# Patient Record
Sex: MALE | Race: WHITE | NOT HISPANIC OR LATINO | Employment: OTHER | URBAN - METROPOLITAN AREA
[De-identification: names, ages, dates, MRNs, and addresses within clinical notes are randomized per-mention and may not be internally consistent; named-entity substitution may affect disease eponyms.]

---

## 2017-01-01 ENCOUNTER — HOSPITAL ENCOUNTER (INPATIENT)
Facility: HOSPITAL | Age: 52
LOS: 3 days | Discharge: NURSING, REHAB OR LONG-TERM CARE | End: 2017-12-09
Attending: EMERGENCY MEDICINE | Admitting: INTERNAL MEDICINE

## 2017-01-01 ENCOUNTER — APPOINTMENT (OUTPATIENT)
Dept: GENERAL RADIOLOGY | Facility: HOSPITAL | Age: 52
End: 2017-01-01

## 2017-01-01 ENCOUNTER — APPOINTMENT (OUTPATIENT)
Dept: GENERAL RADIOLOGY | Facility: HOSPITAL | Age: 52
End: 2017-01-01
Attending: INTERNAL MEDICINE

## 2017-01-01 ENCOUNTER — HOSPITAL ENCOUNTER (INPATIENT)
Facility: HOSPITAL | Age: 52
LOS: 3 days | Discharge: NURSING, REHAB OR LONG-TERM CARE | End: 2017-06-26
Attending: EMERGENCY MEDICINE | Admitting: INTERNAL MEDICINE

## 2017-01-01 VITALS
BODY MASS INDEX: 30.66 KG/M2 | OXYGEN SATURATION: 97 % | DIASTOLIC BLOOD PRESSURE: 92 MMHG | WEIGHT: 219 LBS | HEART RATE: 77 BPM | SYSTOLIC BLOOD PRESSURE: 132 MMHG | HEIGHT: 71 IN | RESPIRATION RATE: 16 BRPM | TEMPERATURE: 98 F

## 2017-01-01 VITALS
OXYGEN SATURATION: 97 % | WEIGHT: 211.7 LBS | RESPIRATION RATE: 16 BRPM | DIASTOLIC BLOOD PRESSURE: 84 MMHG | HEART RATE: 83 BPM | BODY MASS INDEX: 31.36 KG/M2 | TEMPERATURE: 98.6 F | SYSTOLIC BLOOD PRESSURE: 120 MMHG | HEIGHT: 69 IN

## 2017-01-01 DIAGNOSIS — J10.1 INFLUENZA A: ICD-10-CM

## 2017-01-01 DIAGNOSIS — R50.9 FEVER, UNSPECIFIED FEVER CAUSE: ICD-10-CM

## 2017-01-01 DIAGNOSIS — R53.1 GENERALIZED WEAKNESS: ICD-10-CM

## 2017-01-01 DIAGNOSIS — N39.0 URINARY TRACT INFECTION WITHOUT HEMATURIA, SITE UNSPECIFIED: Primary | ICD-10-CM

## 2017-01-01 DIAGNOSIS — A41.9 SEPSIS, DUE TO UNSPECIFIED ORGANISM: Primary | ICD-10-CM

## 2017-01-01 LAB
ALBUMIN SERPL-MCNC: 4.2 G/DL (ref 3.5–5.2)
ALBUMIN SERPL-MCNC: 4.3 G/DL (ref 3.5–5.2)
ALBUMIN/GLOB SERPL: 1.2 G/DL
ALBUMIN/GLOB SERPL: 1.3 G/DL
ALP SERPL-CCNC: 130 U/L (ref 39–117)
ALP SERPL-CCNC: 137 U/L (ref 39–117)
ALT SERPL W P-5'-P-CCNC: 29 U/L (ref 1–41)
ALT SERPL W P-5'-P-CCNC: 31 U/L (ref 1–41)
ANION GAP SERPL CALCULATED.3IONS-SCNC: 12.4 MMOL/L
ANION GAP SERPL CALCULATED.3IONS-SCNC: 12.5 MMOL/L
ANION GAP SERPL CALCULATED.3IONS-SCNC: 13.5 MMOL/L
ANION GAP SERPL CALCULATED.3IONS-SCNC: 14.1 MMOL/L
ANION GAP SERPL CALCULATED.3IONS-SCNC: 14.1 MMOL/L
ANION GAP SERPL CALCULATED.3IONS-SCNC: 14.3 MMOL/L
ANION GAP SERPL CALCULATED.3IONS-SCNC: 15.1 MMOL/L
ANION GAP SERPL CALCULATED.3IONS-SCNC: 15.5 MMOL/L
ANION GAP SERPL CALCULATED.3IONS-SCNC: 18.7 MMOL/L
AST SERPL-CCNC: 19 U/L (ref 1–40)
AST SERPL-CCNC: 21 U/L (ref 1–40)
BACTERIA SPEC AEROBE CULT: ABNORMAL
BACTERIA SPEC AEROBE CULT: NORMAL
BACTERIA UR QL AUTO: ABNORMAL /HPF
BASOPHILS # BLD AUTO: 0.03 10*3/MM3 (ref 0–0.2)
BASOPHILS # BLD AUTO: 0.04 10*3/MM3 (ref 0–0.2)
BASOPHILS # BLD AUTO: 0.04 10*3/MM3 (ref 0–0.2)
BASOPHILS # BLD AUTO: 0.05 10*3/MM3 (ref 0–0.2)
BASOPHILS # BLD AUTO: 0.06 10*3/MM3 (ref 0–0.2)
BASOPHILS NFR BLD AUTO: 0.2 % (ref 0–1.5)
BASOPHILS NFR BLD AUTO: 0.5 % (ref 0–1.5)
BASOPHILS NFR BLD AUTO: 0.6 % (ref 0–1.5)
BASOPHILS NFR BLD AUTO: 0.6 % (ref 0–1.5)
BASOPHILS NFR BLD AUTO: 0.7 % (ref 0–1.5)
BILIRUB SERPL-MCNC: 0.2 MG/DL (ref 0.1–1.2)
BILIRUB SERPL-MCNC: 0.3 MG/DL (ref 0.1–1.2)
BILIRUB UR QL STRIP: NEGATIVE
BILIRUB UR QL STRIP: NEGATIVE
BUN BLD-MCNC: 10 MG/DL (ref 6–20)
BUN BLD-MCNC: 12 MG/DL (ref 6–20)
BUN BLD-MCNC: 12 MG/DL (ref 6–20)
BUN BLD-MCNC: 6 MG/DL (ref 6–20)
BUN BLD-MCNC: 8 MG/DL (ref 6–20)
BUN BLD-MCNC: 8 MG/DL (ref 6–20)
BUN BLD-MCNC: 9 MG/DL (ref 6–20)
BUN/CREAT SERPL: 10.8 (ref 7–25)
BUN/CREAT SERPL: 11.9 (ref 7–25)
BUN/CREAT SERPL: 14 (ref 7–25)
BUN/CREAT SERPL: 14.3 (ref 7–25)
BUN/CREAT SERPL: 14.5 (ref 7–25)
BUN/CREAT SERPL: 14.6 (ref 7–25)
BUN/CREAT SERPL: 14.7 (ref 7–25)
BUN/CREAT SERPL: 15.6 (ref 7–25)
BUN/CREAT SERPL: 7 (ref 7–25)
CALCIUM SPEC-SCNC: 8.1 MG/DL (ref 8.6–10.5)
CALCIUM SPEC-SCNC: 8.3 MG/DL (ref 8.6–10.5)
CALCIUM SPEC-SCNC: 8.4 MG/DL (ref 8.6–10.5)
CALCIUM SPEC-SCNC: 8.5 MG/DL (ref 8.6–10.5)
CALCIUM SPEC-SCNC: 8.5 MG/DL (ref 8.6–10.5)
CALCIUM SPEC-SCNC: 8.7 MG/DL (ref 8.6–10.5)
CALCIUM SPEC-SCNC: 8.7 MG/DL (ref 8.6–10.5)
CALCIUM SPEC-SCNC: 9.7 MG/DL (ref 8.6–10.5)
CALCIUM SPEC-SCNC: 9.8 MG/DL (ref 8.6–10.5)
CHLORIDE SERPL-SCNC: 100 MMOL/L (ref 98–107)
CHLORIDE SERPL-SCNC: 100 MMOL/L (ref 98–107)
CHLORIDE SERPL-SCNC: 101 MMOL/L (ref 98–107)
CHLORIDE SERPL-SCNC: 103 MMOL/L (ref 98–107)
CHLORIDE SERPL-SCNC: 105 MMOL/L (ref 98–107)
CHLORIDE SERPL-SCNC: 107 MMOL/L (ref 98–107)
CHLORIDE SERPL-SCNC: 107 MMOL/L (ref 98–107)
CHLORIDE SERPL-SCNC: 94 MMOL/L (ref 98–107)
CHLORIDE SERPL-SCNC: 95 MMOL/L (ref 98–107)
CLARITY UR: ABNORMAL
CLARITY UR: CLEAR
CO2 SERPL-SCNC: 20.9 MMOL/L (ref 22–29)
CO2 SERPL-SCNC: 22.9 MMOL/L (ref 22–29)
CO2 SERPL-SCNC: 22.9 MMOL/L (ref 22–29)
CO2 SERPL-SCNC: 23.5 MMOL/L (ref 22–29)
CO2 SERPL-SCNC: 23.6 MMOL/L (ref 22–29)
CO2 SERPL-SCNC: 23.7 MMOL/L (ref 22–29)
CO2 SERPL-SCNC: 24.5 MMOL/L (ref 22–29)
CO2 SERPL-SCNC: 26.3 MMOL/L (ref 22–29)
CO2 SERPL-SCNC: 27.5 MMOL/L (ref 22–29)
COLOR UR: YELLOW
COLOR UR: YELLOW
CREAT BLD-MCNC: 0.57 MG/DL (ref 0.76–1.27)
CREAT BLD-MCNC: 0.67 MG/DL (ref 0.76–1.27)
CREAT BLD-MCNC: 0.68 MG/DL (ref 0.76–1.27)
CREAT BLD-MCNC: 0.69 MG/DL (ref 0.76–1.27)
CREAT BLD-MCNC: 0.7 MG/DL (ref 0.76–1.27)
CREAT BLD-MCNC: 0.77 MG/DL (ref 0.76–1.27)
CREAT BLD-MCNC: 0.82 MG/DL (ref 0.76–1.27)
CREAT BLD-MCNC: 0.83 MG/DL (ref 0.76–1.27)
CREAT BLD-MCNC: 0.86 MG/DL (ref 0.76–1.27)
D-LACTATE SERPL-SCNC: 1.4 MMOL/L (ref 0.5–2)
D-LACTATE SERPL-SCNC: 1.5 MMOL/L (ref 0.5–2)
D-LACTATE SERPL-SCNC: 7.5 MMOL/L (ref 0.5–2)
DEPRECATED RDW RBC AUTO: 45.9 FL (ref 37–54)
DEPRECATED RDW RBC AUTO: 45.9 FL (ref 37–54)
DEPRECATED RDW RBC AUTO: 46.5 FL (ref 37–54)
DEPRECATED RDW RBC AUTO: 47.1 FL (ref 37–54)
DEPRECATED RDW RBC AUTO: 47.2 FL (ref 37–54)
DEPRECATED RDW RBC AUTO: 48 FL (ref 37–54)
DEPRECATED RDW RBC AUTO: 48.9 FL (ref 37–54)
DEPRECATED RDW RBC AUTO: 49 FL (ref 37–54)
DEPRECATED RDW RBC AUTO: 49.2 FL (ref 37–54)
EOSINOPHIL # BLD AUTO: 0 10*3/MM3 (ref 0–0.7)
EOSINOPHIL # BLD AUTO: 0.03 10*3/MM3 (ref 0–0.7)
EOSINOPHIL # BLD AUTO: 0.04 10*3/MM3 (ref 0–0.7)
EOSINOPHIL # BLD AUTO: 0.11 10*3/MM3 (ref 0–0.7)
EOSINOPHIL # BLD AUTO: 0.14 10*3/MM3 (ref 0–0.7)
EOSINOPHIL NFR BLD AUTO: 0 % (ref 0.3–6.2)
EOSINOPHIL NFR BLD AUTO: 0.4 % (ref 0.3–6.2)
EOSINOPHIL NFR BLD AUTO: 0.5 % (ref 0.3–6.2)
EOSINOPHIL NFR BLD AUTO: 1.3 % (ref 0.3–6.2)
EOSINOPHIL NFR BLD AUTO: 2.3 % (ref 0.3–6.2)
ERYTHROCYTE [DISTWIDTH] IN BLOOD BY AUTOMATED COUNT: 14.6 % (ref 11.5–14.5)
ERYTHROCYTE [DISTWIDTH] IN BLOOD BY AUTOMATED COUNT: 14.7 % (ref 11.5–14.5)
ERYTHROCYTE [DISTWIDTH] IN BLOOD BY AUTOMATED COUNT: 14.7 % (ref 11.5–14.5)
ERYTHROCYTE [DISTWIDTH] IN BLOOD BY AUTOMATED COUNT: 14.8 % (ref 11.5–14.5)
ERYTHROCYTE [DISTWIDTH] IN BLOOD BY AUTOMATED COUNT: 14.9 % (ref 11.5–14.5)
ERYTHROCYTE [DISTWIDTH] IN BLOOD BY AUTOMATED COUNT: 15 % (ref 11.5–14.5)
ERYTHROCYTE [DISTWIDTH] IN BLOOD BY AUTOMATED COUNT: 15.4 % (ref 11.5–14.5)
ERYTHROCYTE [DISTWIDTH] IN BLOOD BY AUTOMATED COUNT: 15.4 % (ref 11.5–14.5)
ERYTHROCYTE [DISTWIDTH] IN BLOOD BY AUTOMATED COUNT: 15.5 % (ref 11.5–14.5)
FLUAV AG NPH QL: POSITIVE
FLUBV AG NPH QL IA: NEGATIVE
GFR SERPL CREATININE-BSD FRML MDRD: 107 ML/MIN/1.73
GFR SERPL CREATININE-BSD FRML MDRD: 119 ML/MIN/1.73
GFR SERPL CREATININE-BSD FRML MDRD: 121 ML/MIN/1.73
GFR SERPL CREATININE-BSD FRML MDRD: 123 ML/MIN/1.73
GFR SERPL CREATININE-BSD FRML MDRD: 125 ML/MIN/1.73
GFR SERPL CREATININE-BSD FRML MDRD: 94 ML/MIN/1.73
GFR SERPL CREATININE-BSD FRML MDRD: 98 ML/MIN/1.73
GFR SERPL CREATININE-BSD FRML MDRD: 99 ML/MIN/1.73
GFR SERPL CREATININE-BSD FRML MDRD: >150 ML/MIN/1.73
GLOBULIN UR ELPH-MCNC: 3.3 GM/DL
GLOBULIN UR ELPH-MCNC: 3.5 GM/DL
GLUCOSE BLD-MCNC: 104 MG/DL (ref 65–99)
GLUCOSE BLD-MCNC: 107 MG/DL (ref 65–99)
GLUCOSE BLD-MCNC: 108 MG/DL (ref 65–99)
GLUCOSE BLD-MCNC: 144 MG/DL (ref 65–99)
GLUCOSE BLD-MCNC: 80 MG/DL (ref 65–99)
GLUCOSE BLD-MCNC: 86 MG/DL (ref 65–99)
GLUCOSE BLD-MCNC: 88 MG/DL (ref 65–99)
GLUCOSE BLD-MCNC: 90 MG/DL (ref 65–99)
GLUCOSE BLD-MCNC: 93 MG/DL (ref 65–99)
GLUCOSE BLDC GLUCOMTR-MCNC: 107 MG/DL (ref 70–130)
GLUCOSE UR STRIP-MCNC: NEGATIVE MG/DL
GLUCOSE UR STRIP-MCNC: NEGATIVE MG/DL
HCT VFR BLD AUTO: 39.9 % (ref 40.4–52.2)
HCT VFR BLD AUTO: 40.1 % (ref 40.4–52.2)
HCT VFR BLD AUTO: 40.4 % (ref 40.4–52.2)
HCT VFR BLD AUTO: 41 % (ref 40.4–52.2)
HCT VFR BLD AUTO: 41 % (ref 40.4–52.2)
HCT VFR BLD AUTO: 41.6 % (ref 40.4–52.2)
HCT VFR BLD AUTO: 42.9 % (ref 40.4–52.2)
HCT VFR BLD AUTO: 44.7 % (ref 40.4–52.2)
HCT VFR BLD AUTO: 45.5 % (ref 40.4–52.2)
HGB BLD-MCNC: 12.5 G/DL (ref 13.7–17.6)
HGB BLD-MCNC: 12.9 G/DL (ref 13.7–17.6)
HGB BLD-MCNC: 13 G/DL (ref 13.7–17.6)
HGB BLD-MCNC: 13.1 G/DL (ref 13.7–17.6)
HGB BLD-MCNC: 13.1 G/DL (ref 13.7–17.6)
HGB BLD-MCNC: 13.3 G/DL (ref 13.7–17.6)
HGB BLD-MCNC: 13.5 G/DL (ref 13.7–17.6)
HGB BLD-MCNC: 14.7 G/DL (ref 13.7–17.6)
HGB BLD-MCNC: 14.7 G/DL (ref 13.7–17.6)
HGB UR QL STRIP.AUTO: ABNORMAL
HGB UR QL STRIP.AUTO: NEGATIVE
HOLD SPECIMEN: NORMAL
HYALINE CASTS UR QL AUTO: ABNORMAL /LPF
IMM GRANULOCYTES # BLD: 0 10*3/MM3 (ref 0–0.03)
IMM GRANULOCYTES # BLD: 0.02 10*3/MM3 (ref 0–0.03)
IMM GRANULOCYTES # BLD: 0.03 10*3/MM3 (ref 0–0.03)
IMM GRANULOCYTES # BLD: 0.03 10*3/MM3 (ref 0–0.03)
IMM GRANULOCYTES # BLD: 0.05 10*3/MM3 (ref 0–0.03)
IMM GRANULOCYTES NFR BLD: 0 % (ref 0–0.5)
IMM GRANULOCYTES NFR BLD: 0.3 % (ref 0–0.5)
IMM GRANULOCYTES NFR BLD: 0.4 % (ref 0–0.5)
KETONES UR QL STRIP: NEGATIVE
KETONES UR QL STRIP: NEGATIVE
LEUKOCYTE ESTERASE UR QL STRIP.AUTO: ABNORMAL
LEUKOCYTE ESTERASE UR QL STRIP.AUTO: NEGATIVE
LYMPHOCYTES # BLD AUTO: 0.98 10*3/MM3 (ref 0.9–4.8)
LYMPHOCYTES # BLD AUTO: 1.07 10*3/MM3 (ref 0.9–4.8)
LYMPHOCYTES # BLD AUTO: 1.5 10*3/MM3 (ref 0.9–4.8)
LYMPHOCYTES # BLD AUTO: 1.98 10*3/MM3 (ref 0.9–4.8)
LYMPHOCYTES # BLD AUTO: 2.07 10*3/MM3 (ref 0.9–4.8)
LYMPHOCYTES NFR BLD AUTO: 17.3 % (ref 19.6–45.3)
LYMPHOCYTES NFR BLD AUTO: 18.4 % (ref 19.6–45.3)
LYMPHOCYTES NFR BLD AUTO: 25.3 % (ref 19.6–45.3)
LYMPHOCYTES NFR BLD AUTO: 25.6 % (ref 19.6–45.3)
LYMPHOCYTES NFR BLD AUTO: 8.2 % (ref 19.6–45.3)
MCH RBC QN AUTO: 27.2 PG (ref 27–32.7)
MCH RBC QN AUTO: 27.2 PG (ref 27–32.7)
MCH RBC QN AUTO: 27.4 PG (ref 27–32.7)
MCH RBC QN AUTO: 27.4 PG (ref 27–32.7)
MCH RBC QN AUTO: 27.7 PG (ref 27–32.7)
MCH RBC QN AUTO: 27.8 PG (ref 27–32.7)
MCH RBC QN AUTO: 28.4 PG (ref 27–32.7)
MCHC RBC AUTO-ENTMCNC: 31.3 G/DL (ref 32.6–36.4)
MCHC RBC AUTO-ENTMCNC: 31.5 G/DL (ref 32.6–36.4)
MCHC RBC AUTO-ENTMCNC: 31.7 G/DL (ref 32.6–36.4)
MCHC RBC AUTO-ENTMCNC: 31.9 G/DL (ref 32.6–36.4)
MCHC RBC AUTO-ENTMCNC: 32 G/DL (ref 32.6–36.4)
MCHC RBC AUTO-ENTMCNC: 32 G/DL (ref 32.6–36.4)
MCHC RBC AUTO-ENTMCNC: 32.3 G/DL (ref 32.6–36.4)
MCHC RBC AUTO-ENTMCNC: 32.7 G/DL (ref 32.6–36.4)
MCHC RBC AUTO-ENTMCNC: 32.9 G/DL (ref 32.6–36.4)
MCV RBC AUTO: 84.8 FL (ref 79.8–96.2)
MCV RBC AUTO: 85.2 FL (ref 79.8–96.2)
MCV RBC AUTO: 85.7 FL (ref 79.8–96.2)
MCV RBC AUTO: 86.5 FL (ref 79.8–96.2)
MCV RBC AUTO: 86.5 FL (ref 79.8–96.2)
MCV RBC AUTO: 86.7 FL (ref 79.8–96.2)
MCV RBC AUTO: 86.7 FL (ref 79.8–96.2)
MCV RBC AUTO: 86.8 FL (ref 79.8–96.2)
MCV RBC AUTO: 87 FL (ref 79.8–96.2)
MONOCYTES # BLD AUTO: 0.6 10*3/MM3 (ref 0.2–1.2)
MONOCYTES # BLD AUTO: 0.7 10*3/MM3 (ref 0.2–1.2)
MONOCYTES # BLD AUTO: 0.85 10*3/MM3 (ref 0.2–1.2)
MONOCYTES # BLD AUTO: 1 10*3/MM3 (ref 0.2–1.2)
MONOCYTES # BLD AUTO: 1.02 10*3/MM3 (ref 0.2–1.2)
MONOCYTES NFR BLD AUTO: 10.4 % (ref 5–12)
MONOCYTES NFR BLD AUTO: 13.2 % (ref 5–12)
MONOCYTES NFR BLD AUTO: 16.2 % (ref 5–12)
MONOCYTES NFR BLD AUTO: 5 % (ref 5–12)
MONOCYTES NFR BLD AUTO: 8.6 % (ref 5–12)
NEUTROPHILS # BLD AUTO: 10.35 10*3/MM3 (ref 1.9–8.1)
NEUTROPHILS # BLD AUTO: 3.91 10*3/MM3 (ref 1.9–8.1)
NEUTROPHILS # BLD AUTO: 4.61 10*3/MM3 (ref 1.9–8.1)
NEUTROPHILS # BLD AUTO: 5.07 10*3/MM3 (ref 1.9–8.1)
NEUTROPHILS # BLD AUTO: 5.89 10*3/MM3 (ref 1.9–8.1)
NEUTROPHILS NFR BLD AUTO: 59.7 % (ref 42.7–76)
NEUTROPHILS NFR BLD AUTO: 61.9 % (ref 42.7–76)
NEUTROPHILS NFR BLD AUTO: 63.3 % (ref 42.7–76)
NEUTROPHILS NFR BLD AUTO: 72.1 % (ref 42.7–76)
NEUTROPHILS NFR BLD AUTO: 86.2 % (ref 42.7–76)
NITRITE UR QL STRIP: NEGATIVE
NITRITE UR QL STRIP: POSITIVE
PH UR STRIP.AUTO: 7.5 [PH] (ref 5–8)
PH UR STRIP.AUTO: 8 [PH] (ref 5–8)
PLATELET # BLD AUTO: 204 10*3/MM3 (ref 140–500)
PLATELET # BLD AUTO: 221 10*3/MM3 (ref 140–500)
PLATELET # BLD AUTO: 223 10*3/MM3 (ref 140–500)
PLATELET # BLD AUTO: 226 10*3/MM3 (ref 140–500)
PLATELET # BLD AUTO: 230 10*3/MM3 (ref 140–500)
PLATELET # BLD AUTO: 245 10*3/MM3 (ref 140–500)
PLATELET # BLD AUTO: 245 10*3/MM3 (ref 140–500)
PLATELET # BLD AUTO: 293 10*3/MM3 (ref 140–500)
PLATELET # BLD AUTO: 322 10*3/MM3 (ref 140–500)
PMV BLD AUTO: 10 FL (ref 6–12)
PMV BLD AUTO: 10.1 FL (ref 6–12)
PMV BLD AUTO: 10.1 FL (ref 6–12)
PMV BLD AUTO: 10.2 FL (ref 6–12)
PMV BLD AUTO: 10.3 FL (ref 6–12)
PMV BLD AUTO: 10.3 FL (ref 6–12)
PMV BLD AUTO: 9.8 FL (ref 6–12)
PMV BLD AUTO: 9.9 FL (ref 6–12)
PMV BLD AUTO: 9.9 FL (ref 6–12)
POTASSIUM BLD-SCNC: 3 MMOL/L (ref 3.5–5.2)
POTASSIUM BLD-SCNC: 3.1 MMOL/L (ref 3.5–5.2)
POTASSIUM BLD-SCNC: 3.1 MMOL/L (ref 3.5–5.2)
POTASSIUM BLD-SCNC: 3.4 MMOL/L (ref 3.5–5.2)
POTASSIUM BLD-SCNC: 3.5 MMOL/L (ref 3.5–5.2)
POTASSIUM BLD-SCNC: 3.7 MMOL/L (ref 3.5–5.2)
POTASSIUM BLD-SCNC: 4.1 MMOL/L (ref 3.5–5.2)
PROCALCITONIN SERPL-MCNC: 0.05 NG/ML (ref 0.1–0.25)
PROT SERPL-MCNC: 7.6 G/DL (ref 6–8.5)
PROT SERPL-MCNC: 7.7 G/DL (ref 6–8.5)
PROT UR QL STRIP: ABNORMAL
PROT UR QL STRIP: NEGATIVE
RBC # BLD AUTO: 4.6 10*6/MM3 (ref 4.6–6)
RBC # BLD AUTO: 4.73 10*6/MM3 (ref 4.6–6)
RBC # BLD AUTO: 4.73 10*6/MM3 (ref 4.6–6)
RBC # BLD AUTO: 4.74 10*6/MM3 (ref 4.6–6)
RBC # BLD AUTO: 4.74 10*6/MM3 (ref 4.6–6)
RBC # BLD AUTO: 4.79 10*6/MM3 (ref 4.6–6)
RBC # BLD AUTO: 4.93 10*6/MM3 (ref 4.6–6)
RBC # BLD AUTO: 5.17 10*6/MM3 (ref 4.6–6)
RBC # BLD AUTO: 5.31 10*6/MM3 (ref 4.6–6)
RBC # UR: ABNORMAL /HPF
REF LAB TEST METHOD: ABNORMAL
SODIUM BLD-SCNC: 132 MMOL/L (ref 136–145)
SODIUM BLD-SCNC: 137 MMOL/L (ref 136–145)
SODIUM BLD-SCNC: 139 MMOL/L (ref 136–145)
SODIUM BLD-SCNC: 140 MMOL/L (ref 136–145)
SODIUM BLD-SCNC: 140 MMOL/L (ref 136–145)
SODIUM BLD-SCNC: 141 MMOL/L (ref 136–145)
SODIUM BLD-SCNC: 142 MMOL/L (ref 136–145)
SODIUM BLD-SCNC: 143 MMOL/L (ref 136–145)
SODIUM BLD-SCNC: 144 MMOL/L (ref 136–145)
SP GR UR STRIP: 1.01 (ref 1–1.03)
SP GR UR STRIP: 1.02 (ref 1–1.03)
SQUAMOUS #/AREA URNS HPF: ABNORMAL /HPF
UROBILINOGEN UR QL STRIP: ABNORMAL
UROBILINOGEN UR QL STRIP: NORMAL
WBC CLUMPS # UR AUTO: ABNORMAL /HPF
WBC NRBC COR # BLD: 11.23 10*3/MM3 (ref 4.5–10.7)
WBC NRBC COR # BLD: 12.01 10*3/MM3 (ref 4.5–10.7)
WBC NRBC COR # BLD: 6.18 10*3/MM3 (ref 4.5–10.7)
WBC NRBC COR # BLD: 6.3 10*3/MM3 (ref 4.5–10.7)
WBC NRBC COR # BLD: 6.34 10*3/MM3 (ref 4.5–10.7)
WBC NRBC COR # BLD: 7.73 10*3/MM3 (ref 4.5–10.7)
WBC NRBC COR # BLD: 8.16 10*3/MM3 (ref 4.5–10.7)
WBC NRBC COR # BLD: 8.19 10*3/MM3 (ref 4.5–10.7)
WBC NRBC COR # BLD: 8.7 10*3/MM3 (ref 4.5–10.7)
WBC UR QL AUTO: ABNORMAL /HPF
WHOLE BLOOD HOLD SPECIMEN: NORMAL
WHOLE BLOOD HOLD SPECIMEN: NORMAL

## 2017-01-01 PROCEDURE — 25010000002 ENOXAPARIN PER 10 MG: Performed by: INTERNAL MEDICINE

## 2017-01-01 PROCEDURE — 80048 BASIC METABOLIC PNL TOTAL CA: CPT | Performed by: INTERNAL MEDICINE

## 2017-01-01 PROCEDURE — 87186 SC STD MICRODIL/AGAR DIL: CPT | Performed by: EMERGENCY MEDICINE

## 2017-01-01 PROCEDURE — 81003 URINALYSIS AUTO W/O SCOPE: CPT | Performed by: EMERGENCY MEDICINE

## 2017-01-01 PROCEDURE — 85027 COMPLETE CBC AUTOMATED: CPT | Performed by: INTERNAL MEDICINE

## 2017-01-01 PROCEDURE — 80048 BASIC METABOLIC PNL TOTAL CA: CPT | Performed by: NURSE PRACTITIONER

## 2017-01-01 PROCEDURE — 80053 COMPREHEN METABOLIC PANEL: CPT | Performed by: EMERGENCY MEDICINE

## 2017-01-01 PROCEDURE — 85025 COMPLETE CBC W/AUTO DIFF WBC: CPT | Performed by: NURSE PRACTITIONER

## 2017-01-01 PROCEDURE — 92610 EVALUATE SWALLOWING FUNCTION: CPT

## 2017-01-01 PROCEDURE — 99285 EMERGENCY DEPT VISIT HI MDM: CPT

## 2017-01-01 PROCEDURE — 83605 ASSAY OF LACTIC ACID: CPT | Performed by: EMERGENCY MEDICINE

## 2017-01-01 PROCEDURE — 85025 COMPLETE CBC W/AUTO DIFF WBC: CPT | Performed by: EMERGENCY MEDICINE

## 2017-01-01 PROCEDURE — 97162 PT EVAL MOD COMPLEX 30 MIN: CPT

## 2017-01-01 PROCEDURE — G0378 HOSPITAL OBSERVATION PER HR: HCPCS

## 2017-01-01 PROCEDURE — 82962 GLUCOSE BLOOD TEST: CPT

## 2017-01-01 PROCEDURE — 87040 BLOOD CULTURE FOR BACTERIA: CPT | Performed by: EMERGENCY MEDICINE

## 2017-01-01 PROCEDURE — 25010000003 LEVETIRACETAM IN NACL 0.75% 1000 MG/100ML SOLUTION: Performed by: EMERGENCY MEDICINE

## 2017-01-01 PROCEDURE — 25010000002 PIPERACILLIN SOD-TAZOBACTAM PER 1 G: Performed by: EMERGENCY MEDICINE

## 2017-01-01 PROCEDURE — 97110 THERAPEUTIC EXERCISES: CPT

## 2017-01-01 PROCEDURE — 25010000002 PIPERACILLIN SOD-TAZOBACTAM PER 1 G: Performed by: INTERNAL MEDICINE

## 2017-01-01 PROCEDURE — 25010000003 CEFTRIAXONE PER 250 MG: Performed by: INTERNAL MEDICINE

## 2017-01-01 PROCEDURE — 71010 HC CHEST PA OR AP: CPT

## 2017-01-01 PROCEDURE — 25010000003 CEFTRIAXONE PER 250 MG: Performed by: EMERGENCY MEDICINE

## 2017-01-01 PROCEDURE — 92526 ORAL FUNCTION THERAPY: CPT

## 2017-01-01 PROCEDURE — 84145 PROCALCITONIN (PCT): CPT | Performed by: EMERGENCY MEDICINE

## 2017-01-01 PROCEDURE — 87040 BLOOD CULTURE FOR BACTERIA: CPT | Performed by: INTERNAL MEDICINE

## 2017-01-01 PROCEDURE — 87804 INFLUENZA ASSAY W/OPTIC: CPT | Performed by: EMERGENCY MEDICINE

## 2017-01-01 PROCEDURE — 71020 HC CHEST PA AND LATERAL: CPT

## 2017-01-01 PROCEDURE — 87086 URINE CULTURE/COLONY COUNT: CPT | Performed by: EMERGENCY MEDICINE

## 2017-01-01 PROCEDURE — 81001 URINALYSIS AUTO W/SCOPE: CPT | Performed by: EMERGENCY MEDICINE

## 2017-01-01 RX ORDER — LISINOPRIL 10 MG/1
10 TABLET ORAL
Status: DISCONTINUED | OUTPATIENT
Start: 2017-01-01 | End: 2017-01-01 | Stop reason: HOSPADM

## 2017-01-01 RX ORDER — LEVETIRACETAM 10 MG/ML
1000 INJECTION INTRAVASCULAR ONCE
Status: COMPLETED | OUTPATIENT
Start: 2017-01-01 | End: 2017-01-01

## 2017-01-01 RX ORDER — POTASSIUM CHLORIDE 1.5 G/1.77G
40 POWDER, FOR SOLUTION ORAL 2 TIMES DAILY
Status: COMPLETED | OUTPATIENT
Start: 2017-01-01 | End: 2017-01-01

## 2017-01-01 RX ORDER — OSELTAMIVIR PHOSPHATE 75 MG/1
75 CAPSULE ORAL EVERY 12 HOURS SCHEDULED
Qty: 4 CAPSULE | Refills: 0 | Status: SHIPPED | OUTPATIENT
Start: 2017-01-01 | End: 2017-01-01

## 2017-01-01 RX ORDER — WATER / MINERAL OIL / WHITE PETROLATUM 16 OZ
CREAM TOPICAL 2 TIMES DAILY
COMMUNITY
End: 2017-01-01 | Stop reason: HOSPADM

## 2017-01-01 RX ORDER — ATORVASTATIN CALCIUM 80 MG/1
80 TABLET, FILM COATED ORAL NIGHTLY
Status: DISCONTINUED | OUTPATIENT
Start: 2017-01-01 | End: 2017-01-01 | Stop reason: HOSPADM

## 2017-01-01 RX ORDER — ATORVASTATIN CALCIUM 80 MG/1
80 TABLET, FILM COATED ORAL DAILY
Status: DISCONTINUED | OUTPATIENT
Start: 2017-01-01 | End: 2017-01-01 | Stop reason: HOSPADM

## 2017-01-01 RX ORDER — OSELTAMIVIR PHOSPHATE 75 MG/1
75 CAPSULE ORAL EVERY 12 HOURS SCHEDULED
Status: DISCONTINUED | OUTPATIENT
Start: 2017-01-01 | End: 2017-01-01 | Stop reason: HOSPADM

## 2017-01-01 RX ORDER — CEFTRIAXONE SODIUM 1 G/50ML
1 INJECTION, SOLUTION INTRAVENOUS EVERY 24 HOURS
Status: DISCONTINUED | OUTPATIENT
Start: 2017-01-01 | End: 2017-01-01 | Stop reason: HOSPADM

## 2017-01-01 RX ORDER — SODIUM CHLORIDE 0.9 % (FLUSH) 0.9 %
10 SYRINGE (ML) INJECTION AS NEEDED
Status: DISCONTINUED | OUTPATIENT
Start: 2017-01-01 | End: 2017-01-01 | Stop reason: HOSPADM

## 2017-01-01 RX ORDER — FAMOTIDINE 20 MG/1
20 TABLET, FILM COATED ORAL 2 TIMES DAILY
Status: DISCONTINUED | OUTPATIENT
Start: 2017-01-01 | End: 2017-01-01 | Stop reason: HOSPADM

## 2017-01-01 RX ORDER — LEVETIRACETAM 500 MG/1
500 TABLET ORAL 2 TIMES DAILY
Status: DISCONTINUED | OUTPATIENT
Start: 2017-01-01 | End: 2017-01-01 | Stop reason: HOSPADM

## 2017-01-01 RX ORDER — LORAZEPAM 2 MG/ML
1 INJECTION INTRAMUSCULAR EVERY 4 HOURS PRN
Status: DISCONTINUED | OUTPATIENT
Start: 2017-01-01 | End: 2017-01-01 | Stop reason: HOSPADM

## 2017-01-01 RX ORDER — ACETAMINOPHEN 500 MG
1000 TABLET ORAL ONCE
Status: DISCONTINUED | OUTPATIENT
Start: 2017-01-01 | End: 2017-01-01 | Stop reason: HOSPADM

## 2017-01-01 RX ORDER — LEVETIRACETAM 500 MG/1
1000 TABLET ORAL 2 TIMES DAILY
Status: DISCONTINUED | OUTPATIENT
Start: 2017-01-01 | End: 2017-01-01 | Stop reason: HOSPADM

## 2017-01-01 RX ORDER — LEVETIRACETAM 750 MG/1
500 TABLET ORAL 2 TIMES DAILY
COMMUNITY
Start: 2016-12-07 | End: 2017-01-01 | Stop reason: HOSPADM

## 2017-01-01 RX ORDER — VERAPAMIL HYDROCHLORIDE 240 MG/1
240 TABLET, FILM COATED, EXTENDED RELEASE ORAL NIGHTLY
Status: DISCONTINUED | OUTPATIENT
Start: 2017-01-01 | End: 2017-01-01 | Stop reason: HOSPADM

## 2017-01-01 RX ORDER — CASTOR OIL AND BALSAM, PERU 788; 87 MG/G; MG/G
OINTMENT TOPICAL EVERY MORNING
COMMUNITY

## 2017-01-01 RX ORDER — HYDROCHLOROTHIAZIDE 25 MG/1
25 TABLET ORAL DAILY
Status: DISCONTINUED | OUTPATIENT
Start: 2017-01-01 | End: 2017-01-01 | Stop reason: HOSPADM

## 2017-01-01 RX ORDER — POTASSIUM CHLORIDE 750 MG/1
40 CAPSULE, EXTENDED RELEASE ORAL ONCE
Status: COMPLETED | OUTPATIENT
Start: 2017-01-01 | End: 2017-01-01

## 2017-01-01 RX ORDER — ONDANSETRON 4 MG/1
4 TABLET, FILM COATED ORAL EVERY 6 HOURS PRN
COMMUNITY

## 2017-01-01 RX ORDER — OXCARBAZEPINE 300 MG/1
1200 TABLET, FILM COATED ORAL 2 TIMES DAILY
Status: DISCONTINUED | OUTPATIENT
Start: 2017-01-01 | End: 2017-01-01 | Stop reason: HOSPADM

## 2017-01-01 RX ORDER — OSELTAMIVIR PHOSPHATE 75 MG/1
75 CAPSULE ORAL ONCE
Status: COMPLETED | OUTPATIENT
Start: 2017-01-01 | End: 2017-01-01

## 2017-01-01 RX ORDER — LEVETIRACETAM 500 MG/1
1500 TABLET ORAL EVERY 12 HOURS SCHEDULED
Status: DISCONTINUED | OUTPATIENT
Start: 2017-01-01 | End: 2017-01-01 | Stop reason: HOSPADM

## 2017-01-01 RX ORDER — LEVETIRACETAM 500 MG/1
500 TABLET ORAL 2 TIMES DAILY
Qty: 60 TABLET | Refills: 3 | Status: SHIPPED | OUTPATIENT
Start: 2017-01-01

## 2017-01-01 RX ORDER — OXCARBAZEPINE 300 MG/1
1200 TABLET, FILM COATED ORAL EVERY 12 HOURS SCHEDULED
Status: DISCONTINUED | OUTPATIENT
Start: 2017-01-01 | End: 2017-01-01 | Stop reason: HOSPADM

## 2017-01-01 RX ORDER — GABAPENTIN 600 MG/1
600 TABLET ORAL 4 TIMES DAILY
COMMUNITY
Start: 2016-01-28 | End: 2017-01-01 | Stop reason: HOSPADM

## 2017-01-01 RX ORDER — ACETAMINOPHEN 325 MG/1
650 TABLET ORAL EVERY 4 HOURS PRN
Status: DISCONTINUED | OUTPATIENT
Start: 2017-01-01 | End: 2017-01-01 | Stop reason: HOSPADM

## 2017-01-01 RX ORDER — POTASSIUM CHLORIDE 750 MG/1
20 TABLET, FILM COATED, EXTENDED RELEASE ORAL DAILY
Qty: 10 TABLET | Refills: 0 | Status: SHIPPED | OUTPATIENT
Start: 2017-01-01 | End: 2017-01-01

## 2017-01-01 RX ORDER — VERAPAMIL HYDROCHLORIDE 240 MG/1
240 TABLET, FILM COATED, EXTENDED RELEASE ORAL NIGHTLY
COMMUNITY
Start: 2016-02-01

## 2017-01-01 RX ORDER — HYDROCHLOROTHIAZIDE 25 MG/1
25 TABLET ORAL DAILY
COMMUNITY
End: 2017-01-01 | Stop reason: HOSPADM

## 2017-01-01 RX ORDER — CEFUROXIME AXETIL 250 MG/1
250 TABLET ORAL 2 TIMES DAILY
Qty: 12 TABLET | Refills: 0 | Status: SHIPPED | OUTPATIENT
Start: 2017-01-01 | End: 2017-01-01

## 2017-01-01 RX ORDER — ATORVASTATIN CALCIUM 80 MG/1
80 TABLET, FILM COATED ORAL DAILY
Status: DISCONTINUED | OUTPATIENT
Start: 2017-01-01 | End: 2017-01-01

## 2017-01-01 RX ORDER — LEVETIRACETAM 1000 MG/1
1000 TABLET ORAL 2 TIMES DAILY
Qty: 60 TABLET | Refills: 0 | Status: SHIPPED | OUTPATIENT
Start: 2017-01-01

## 2017-01-01 RX ORDER — GABAPENTIN 300 MG/1
600 CAPSULE ORAL EVERY 6 HOURS
Status: DISCONTINUED | OUTPATIENT
Start: 2017-01-01 | End: 2017-01-01 | Stop reason: HOSPADM

## 2017-01-01 RX ORDER — ACETAMINOPHEN 160 MG/5ML
650 SOLUTION ORAL EVERY 4 HOURS PRN
Status: DISCONTINUED | OUTPATIENT
Start: 2017-01-01 | End: 2017-01-01

## 2017-01-01 RX ORDER — RANITIDINE 150 MG/1
TABLET ORAL
COMMUNITY
Start: 2017-01-01

## 2017-01-01 RX ORDER — CEFTRIAXONE SODIUM 1 G/50ML
1 INJECTION, SOLUTION INTRAVENOUS ONCE
Status: COMPLETED | OUTPATIENT
Start: 2017-01-01 | End: 2017-01-01

## 2017-01-01 RX ORDER — DIAPER,BRIEF,INFANT-TODD,DISP
EACH MISCELLANEOUS DAILY
COMMUNITY

## 2017-01-01 RX ORDER — CLONIDINE HYDROCHLORIDE 0.1 MG/1
0.1 TABLET ORAL 3 TIMES DAILY PRN
COMMUNITY
End: 2017-01-01 | Stop reason: HOSPADM

## 2017-01-01 RX ORDER — OXCARBAZEPINE 600 MG/1
1200 TABLET, FILM COATED ORAL 2 TIMES DAILY
COMMUNITY
Start: 2017-01-01 | End: 2018-03-01

## 2017-01-01 RX ORDER — RANITIDINE 150 MG/1
150 TABLET ORAL NIGHTLY
COMMUNITY
End: 2017-01-01 | Stop reason: HOSPADM

## 2017-01-01 RX ORDER — LORATADINE 10 MG/1
10 TABLET ORAL DAILY
COMMUNITY
End: 2017-01-01 | Stop reason: HOSPADM

## 2017-01-01 RX ORDER — POTASSIUM CHLORIDE 750 MG/1
40 CAPSULE, EXTENDED RELEASE ORAL 2 TIMES DAILY
Status: COMPLETED | OUTPATIENT
Start: 2017-01-01 | End: 2017-01-01

## 2017-01-01 RX ORDER — CLONIDINE HYDROCHLORIDE 0.1 MG/1
0.1 TABLET ORAL 3 TIMES DAILY PRN
Status: DISCONTINUED | OUTPATIENT
Start: 2017-01-01 | End: 2017-01-01 | Stop reason: HOSPADM

## 2017-01-01 RX ORDER — LEVETIRACETAM 500 MG/1
1000 TABLET ORAL 2 TIMES DAILY
COMMUNITY

## 2017-01-01 RX ORDER — ONDANSETRON 4 MG/1
4 TABLET, FILM COATED ORAL EVERY 6 HOURS PRN
Status: DISCONTINUED | OUTPATIENT
Start: 2017-01-01 | End: 2017-01-01 | Stop reason: HOSPADM

## 2017-01-01 RX ORDER — DEXTROSE AND SODIUM CHLORIDE 5; .45 G/100ML; G/100ML
100 INJECTION, SOLUTION INTRAVENOUS CONTINUOUS
Status: DISCONTINUED | OUTPATIENT
Start: 2017-01-01 | End: 2017-01-01

## 2017-01-01 RX ORDER — ATORVASTATIN CALCIUM 40 MG/1
80 TABLET, FILM COATED ORAL
COMMUNITY

## 2017-01-01 RX ORDER — LISINOPRIL 10 MG/1
10 TABLET ORAL
Qty: 30 TABLET | Refills: 2 | Status: SHIPPED | OUTPATIENT
Start: 2017-01-01

## 2017-01-01 RX ADMIN — FAMOTIDINE 20 MG: 20 TABLET, FILM COATED ORAL at 17:37

## 2017-01-01 RX ADMIN — CEFTRIAXONE SODIUM 1 G: 1 INJECTION, SOLUTION INTRAVENOUS at 17:05

## 2017-01-01 RX ADMIN — OXCARBAZEPINE 1200 MG: 300 TABLET ORAL at 08:40

## 2017-01-01 RX ADMIN — ATORVASTATIN CALCIUM 80 MG: 80 TABLET, FILM COATED ORAL at 09:46

## 2017-01-01 RX ADMIN — OXCARBAZEPINE 1200 MG: 300 TABLET ORAL at 08:14

## 2017-01-01 RX ADMIN — POTASSIUM CHLORIDE 40 MEQ: 1.5 POWDER, FOR SOLUTION ORAL at 08:14

## 2017-01-01 RX ADMIN — CEFTRIAXONE SODIUM 1 G: 1 INJECTION, SOLUTION INTRAVENOUS at 16:38

## 2017-01-01 RX ADMIN — POTASSIUM CHLORIDE 40 MEQ: 750 CAPSULE, EXTENDED RELEASE ORAL at 10:29

## 2017-01-01 RX ADMIN — OXCARBAZEPINE 1200 MG: 300 TABLET, FILM COATED ORAL at 20:56

## 2017-01-01 RX ADMIN — LEVETIRACETAM 500 MG: 500 TABLET, FILM COATED ORAL at 09:46

## 2017-01-01 RX ADMIN — POTASSIUM CHLORIDE 40 MEQ: 1.5 POWDER, FOR SOLUTION ORAL at 11:36

## 2017-01-01 RX ADMIN — LEVETIRACETAM 1000 MG: 500 TABLET, FILM COATED ORAL at 08:21

## 2017-01-01 RX ADMIN — TAZOBACTAM SODIUM AND PIPERACILLIN SODIUM 3.38 G: 375; 3 INJECTION, SOLUTION INTRAVENOUS at 17:28

## 2017-01-01 RX ADMIN — LEVETIRACETAM 1500 MG: 500 TABLET, FILM COATED ORAL at 20:51

## 2017-01-01 RX ADMIN — LEVETIRACETAM 1500 MG: 500 TABLET, FILM COATED ORAL at 09:58

## 2017-01-01 RX ADMIN — ENOXAPARIN SODIUM 40 MG: 40 INJECTION SUBCUTANEOUS at 15:05

## 2017-01-01 RX ADMIN — OSELTAMIVIR PHOSPHATE 75 MG: 75 CAPSULE ORAL at 12:58

## 2017-01-01 RX ADMIN — FAMOTIDINE 20 MG: 20 TABLET, FILM COATED ORAL at 18:02

## 2017-01-01 RX ADMIN — ENOXAPARIN SODIUM 40 MG: 40 INJECTION SUBCUTANEOUS at 10:23

## 2017-01-01 RX ADMIN — GABAPENTIN 600 MG: 300 CAPSULE ORAL at 22:15

## 2017-01-01 RX ADMIN — LEVETIRACETAM 1000 MG: 500 TABLET, FILM COATED ORAL at 19:50

## 2017-01-01 RX ADMIN — LEVETIRACETAM 500 MG: 500 TABLET, FILM COATED ORAL at 08:22

## 2017-01-01 RX ADMIN — FAMOTIDINE 20 MG: 20 TABLET, FILM COATED ORAL at 19:54

## 2017-01-01 RX ADMIN — OXCARBAZEPINE 1200 MG: 300 TABLET, FILM COATED ORAL at 08:21

## 2017-01-01 RX ADMIN — VERAPAMIL HYDROCHLORIDE 240 MG: 240 TABLET, FILM COATED, EXTENDED RELEASE ORAL at 20:58

## 2017-01-01 RX ADMIN — LEVETIRACETAM 1500 MG: 500 TABLET, FILM COATED ORAL at 20:56

## 2017-01-01 RX ADMIN — SODIUM CHLORIDE 2721 ML: 9 INJECTION, SOLUTION INTRAVENOUS at 01:21

## 2017-01-01 RX ADMIN — FAMOTIDINE 20 MG: 20 TABLET, FILM COATED ORAL at 08:27

## 2017-01-01 RX ADMIN — GABAPENTIN 600 MG: 300 CAPSULE ORAL at 09:57

## 2017-01-01 RX ADMIN — ATORVASTATIN CALCIUM 80 MG: 80 TABLET, FILM COATED ORAL at 22:52

## 2017-01-01 RX ADMIN — LEVETIRACETAM 1500 MG: 500 TABLET, FILM COATED ORAL at 21:10

## 2017-01-01 RX ADMIN — POTASSIUM CHLORIDE 40 MEQ: 750 CAPSULE, EXTENDED RELEASE ORAL at 13:26

## 2017-01-01 RX ADMIN — GABAPENTIN 600 MG: 300 CAPSULE ORAL at 14:44

## 2017-01-01 RX ADMIN — VERAPAMIL HYDROCHLORIDE 240 MG: 240 TABLET, FILM COATED, EXTENDED RELEASE ORAL at 21:10

## 2017-01-01 RX ADMIN — FAMOTIDINE 20 MG: 20 TABLET, FILM COATED ORAL at 08:32

## 2017-01-01 RX ADMIN — OSELTAMIVIR PHOSPHATE 75 MG: 75 CAPSULE ORAL at 08:39

## 2017-01-01 RX ADMIN — HYDROCHLOROTHIAZIDE 25 MG: 25 TABLET ORAL at 10:22

## 2017-01-01 RX ADMIN — LEVETIRACETAM 500 MG: 500 TABLET, FILM COATED ORAL at 08:41

## 2017-01-01 RX ADMIN — OXCARBAZEPINE 1200 MG: 300 TABLET, FILM COATED ORAL at 20:52

## 2017-01-01 RX ADMIN — ATORVASTATIN CALCIUM 80 MG: 80 TABLET, FILM COATED ORAL at 08:27

## 2017-01-01 RX ADMIN — GABAPENTIN 600 MG: 300 CAPSULE ORAL at 03:56

## 2017-01-01 RX ADMIN — OSELTAMIVIR PHOSPHATE 75 MG: 75 CAPSULE ORAL at 03:16

## 2017-01-01 RX ADMIN — FAMOTIDINE 20 MG: 20 TABLET, FILM COATED ORAL at 18:50

## 2017-01-01 RX ADMIN — OXCARBAZEPINE 1200 MG: 300 TABLET, FILM COATED ORAL at 09:58

## 2017-01-01 RX ADMIN — TAZOBACTAM SODIUM AND PIPERACILLIN SODIUM 3.38 G: 375; 3 INJECTION, SOLUTION INTRAVENOUS at 01:49

## 2017-01-01 RX ADMIN — FAMOTIDINE 20 MG: 20 TABLET, FILM COATED ORAL at 08:40

## 2017-01-01 RX ADMIN — VERAPAMIL HYDROCHLORIDE 240 MG: 240 TABLET, FILM COATED, EXTENDED RELEASE ORAL at 22:52

## 2017-01-01 RX ADMIN — GABAPENTIN 600 MG: 300 CAPSULE ORAL at 20:56

## 2017-01-01 RX ADMIN — LEVETIRACETAM 500 MG: 500 TABLET, FILM COATED ORAL at 17:29

## 2017-01-01 RX ADMIN — ENOXAPARIN SODIUM 40 MG: 40 INJECTION SUBCUTANEOUS at 08:40

## 2017-01-01 RX ADMIN — SODIUM CHLORIDE 1000 ML: 9 INJECTION, SOLUTION INTRAVENOUS at 16:44

## 2017-01-01 RX ADMIN — VERAPAMIL HYDROCHLORIDE 240 MG: 240 TABLET, FILM COATED, EXTENDED RELEASE ORAL at 22:15

## 2017-01-01 RX ADMIN — OXCARBAZEPINE 1200 MG: 300 TABLET, FILM COATED ORAL at 08:28

## 2017-01-01 RX ADMIN — ACETAMINOPHEN 650 MG: 325 TABLET ORAL at 00:19

## 2017-01-01 RX ADMIN — GABAPENTIN 600 MG: 300 CAPSULE ORAL at 08:21

## 2017-01-01 RX ADMIN — GABAPENTIN 600 MG: 300 CAPSULE ORAL at 08:27

## 2017-01-01 RX ADMIN — TAZOBACTAM SODIUM AND PIPERACILLIN SODIUM 3.38 G: 375; 3 INJECTION, SOLUTION INTRAVENOUS at 17:37

## 2017-01-01 RX ADMIN — GABAPENTIN 600 MG: 300 CAPSULE ORAL at 21:10

## 2017-01-01 RX ADMIN — ENOXAPARIN SODIUM 40 MG: 40 INJECTION SUBCUTANEOUS at 08:22

## 2017-01-01 RX ADMIN — ATORVASTATIN CALCIUM 80 MG: 80 TABLET, FILM COATED ORAL at 10:22

## 2017-01-01 RX ADMIN — ACETAMINOPHEN 650 MG: 325 TABLET ORAL at 05:49

## 2017-01-01 RX ADMIN — FAMOTIDINE 20 MG: 20 TABLET, FILM COATED ORAL at 17:29

## 2017-01-01 RX ADMIN — LEVETIRACETAM 1500 MG: 500 TABLET, FILM COATED ORAL at 21:07

## 2017-01-01 RX ADMIN — OSELTAMIVIR PHOSPHATE 75 MG: 75 CAPSULE ORAL at 08:31

## 2017-01-01 RX ADMIN — VERAPAMIL HYDROCHLORIDE 240 MG: 240 TABLET, FILM COATED, EXTENDED RELEASE ORAL at 21:13

## 2017-01-01 RX ADMIN — OSELTAMIVIR PHOSPHATE 75 MG: 75 CAPSULE ORAL at 20:23

## 2017-01-01 RX ADMIN — FAMOTIDINE 20 MG: 20 TABLET, FILM COATED ORAL at 17:36

## 2017-01-01 RX ADMIN — LEVETIRACETAM 1000 MG: 500 TABLET, FILM COATED ORAL at 08:31

## 2017-01-01 RX ADMIN — TAZOBACTAM SODIUM AND PIPERACILLIN SODIUM 3.38 G: 375; 3 INJECTION, SOLUTION INTRAVENOUS at 01:23

## 2017-01-01 RX ADMIN — OXCARBAZEPINE 1200 MG: 300 TABLET ORAL at 12:58

## 2017-01-01 RX ADMIN — ENOXAPARIN SODIUM 40 MG: 40 INJECTION SUBCUTANEOUS at 09:45

## 2017-01-01 RX ADMIN — FAMOTIDINE 20 MG: 20 TABLET, FILM COATED ORAL at 08:13

## 2017-01-01 RX ADMIN — OXCARBAZEPINE 1200 MG: 300 TABLET ORAL at 08:32

## 2017-01-01 RX ADMIN — TAZOBACTAM SODIUM AND PIPERACILLIN SODIUM 3.38 G: 375; 3 INJECTION, SOLUTION INTRAVENOUS at 08:40

## 2017-01-01 RX ADMIN — ATORVASTATIN CALCIUM 80 MG: 80 TABLET, FILM COATED ORAL at 08:21

## 2017-01-01 RX ADMIN — TAZOBACTAM SODIUM AND PIPERACILLIN SODIUM 3.38 G: 375; 3 INJECTION, SOLUTION INTRAVENOUS at 09:46

## 2017-01-01 RX ADMIN — FAMOTIDINE 20 MG: 20 TABLET, FILM COATED ORAL at 10:22

## 2017-01-01 RX ADMIN — OXCARBAZEPINE 1200 MG: 300 TABLET ORAL at 17:37

## 2017-01-01 RX ADMIN — ATORVASTATIN CALCIUM 80 MG: 80 TABLET, FILM COATED ORAL at 09:58

## 2017-01-01 RX ADMIN — FAMOTIDINE 20 MG: 20 TABLET, FILM COATED ORAL at 08:21

## 2017-01-01 RX ADMIN — ENOXAPARIN SODIUM 40 MG: 40 INJECTION SUBCUTANEOUS at 08:28

## 2017-01-01 RX ADMIN — FAMOTIDINE 20 MG: 20 TABLET, FILM COATED ORAL at 22:15

## 2017-01-01 RX ADMIN — CEFTRIAXONE SODIUM 1 G: 1 INJECTION, SOLUTION INTRAVENOUS at 15:06

## 2017-01-01 RX ADMIN — GABAPENTIN 600 MG: 300 CAPSULE ORAL at 21:12

## 2017-01-01 RX ADMIN — LEVETIRACETAM 1500 MG: 500 TABLET, FILM COATED ORAL at 10:22

## 2017-01-01 RX ADMIN — DEXTROSE AND SODIUM CHLORIDE 100 ML/HR: 5; 450 INJECTION, SOLUTION INTRAVENOUS at 22:14

## 2017-01-01 RX ADMIN — LEVETIRACETAM 1000 MG: 500 TABLET, FILM COATED ORAL at 08:39

## 2017-01-01 RX ADMIN — POTASSIUM CHLORIDE 40 MEQ: 1.5 POWDER, FOR SOLUTION ORAL at 17:29

## 2017-01-01 RX ADMIN — GABAPENTIN 600 MG: 300 CAPSULE ORAL at 10:21

## 2017-01-01 RX ADMIN — HYDROCHLOROTHIAZIDE 25 MG: 25 TABLET ORAL at 08:27

## 2017-01-01 RX ADMIN — ATORVASTATIN CALCIUM 80 MG: 80 TABLET, FILM COATED ORAL at 22:15

## 2017-01-01 RX ADMIN — SODIUM CHLORIDE 2721 ML: 9 INJECTION, SOLUTION INTRAVENOUS at 01:37

## 2017-01-01 RX ADMIN — LEVETIRACETAM 500 MG: 500 TABLET, FILM COATED ORAL at 08:32

## 2017-01-01 RX ADMIN — HYDROCHLOROTHIAZIDE 25 MG: 25 TABLET ORAL at 08:21

## 2017-01-01 RX ADMIN — SODIUM CHLORIDE 2721 ML: 9 INJECTION, SOLUTION INTRAVENOUS at 01:06

## 2017-01-01 RX ADMIN — VERAPAMIL HYDROCHLORIDE 240 MG: 240 TABLET, FILM COATED, EXTENDED RELEASE ORAL at 20:23

## 2017-01-01 RX ADMIN — LEVETIRACETAM 1000 MG: 500 TABLET, FILM COATED ORAL at 17:37

## 2017-01-01 RX ADMIN — ENOXAPARIN SODIUM 40 MG: 40 INJECTION SUBCUTANEOUS at 08:31

## 2017-01-01 RX ADMIN — OXCARBAZEPINE 1200 MG: 300 TABLET ORAL at 17:28

## 2017-01-01 RX ADMIN — OSELTAMIVIR PHOSPHATE 75 MG: 75 CAPSULE ORAL at 19:50

## 2017-01-01 RX ADMIN — OXCARBAZEPINE 1200 MG: 300 TABLET, FILM COATED ORAL at 21:07

## 2017-01-01 RX ADMIN — ENOXAPARIN SODIUM 40 MG: 40 INJECTION SUBCUTANEOUS at 08:16

## 2017-01-01 RX ADMIN — FAMOTIDINE 20 MG: 20 TABLET, FILM COATED ORAL at 09:46

## 2017-01-01 RX ADMIN — GABAPENTIN 600 MG: 300 CAPSULE ORAL at 13:25

## 2017-01-01 RX ADMIN — ATORVASTATIN CALCIUM 80 MG: 80 TABLET, FILM COATED ORAL at 08:40

## 2017-01-01 RX ADMIN — POTASSIUM CHLORIDE 40 MEQ: 750 CAPSULE, EXTENDED RELEASE ORAL at 08:21

## 2017-01-01 RX ADMIN — LEVETIRACETAM 500 MG: 500 TABLET, FILM COATED ORAL at 19:53

## 2017-01-01 RX ADMIN — LISINOPRIL 10 MG: 10 TABLET ORAL at 08:32

## 2017-01-01 RX ADMIN — POTASSIUM CHLORIDE 40 MEQ: 750 CAPSULE, EXTENDED RELEASE ORAL at 21:07

## 2017-01-01 RX ADMIN — OXCARBAZEPINE 1200 MG: 300 TABLET ORAL at 19:55

## 2017-01-01 RX ADMIN — GABAPENTIN 600 MG: 300 CAPSULE ORAL at 04:02

## 2017-01-01 RX ADMIN — LEVETIRACETAM 1500 MG: 500 TABLET, FILM COATED ORAL at 08:27

## 2017-01-01 RX ADMIN — VERAPAMIL HYDROCHLORIDE 240 MG: 240 TABLET, FILM COATED, EXTENDED RELEASE ORAL at 21:07

## 2017-01-01 RX ADMIN — OXCARBAZEPINE 1200 MG: 300 TABLET, FILM COATED ORAL at 21:10

## 2017-01-01 RX ADMIN — CLONIDINE HYDROCHLORIDE 0.1 MG: 0.1 TABLET ORAL at 20:51

## 2017-01-01 RX ADMIN — LEVETIRACETAM 1000 MG: 500 TABLET, FILM COATED ORAL at 17:29

## 2017-01-01 RX ADMIN — FAMOTIDINE 20 MG: 20 TABLET, FILM COATED ORAL at 09:57

## 2017-01-01 RX ADMIN — ACETAMINOPHEN 650 MG: 325 TABLET ORAL at 21:13

## 2017-01-01 RX ADMIN — CEFTRIAXONE SODIUM 1 G: 1 INJECTION, SOLUTION INTRAVENOUS at 17:35

## 2017-01-01 RX ADMIN — LEVETIRACETAM 1000 MG: 500 TABLET, FILM COATED ORAL at 09:46

## 2017-01-01 RX ADMIN — GABAPENTIN 600 MG: 300 CAPSULE ORAL at 02:03

## 2017-01-01 RX ADMIN — TAZOBACTAM SODIUM AND PIPERACILLIN SODIUM 3.38 G: 375; 3 INJECTION, SOLUTION INTRAVENOUS at 08:14

## 2017-01-01 RX ADMIN — LEVETIRACETAM 1000 MG: 10 INJECTION INTRAVENOUS at 01:30

## 2017-01-01 RX ADMIN — OSELTAMIVIR PHOSPHATE 75 MG: 75 CAPSULE ORAL at 08:13

## 2017-01-01 RX ADMIN — HYDROCHLOROTHIAZIDE 25 MG: 25 TABLET ORAL at 22:15

## 2017-01-01 RX ADMIN — LEVETIRACETAM 500 MG: 500 TABLET, FILM COATED ORAL at 17:38

## 2017-01-01 RX ADMIN — DEXTROSE AND SODIUM CHLORIDE 100 ML/HR: 5; 450 INJECTION, SOLUTION INTRAVENOUS at 02:10

## 2017-01-01 RX ADMIN — OSELTAMIVIR PHOSPHATE 75 MG: 75 CAPSULE ORAL at 21:13

## 2017-01-01 RX ADMIN — OXCARBAZEPINE 1200 MG: 300 TABLET, FILM COATED ORAL at 10:21

## 2017-01-01 RX ADMIN — GABAPENTIN 600 MG: 300 CAPSULE ORAL at 15:06

## 2017-01-01 RX ADMIN — POTASSIUM CHLORIDE 40 MEQ: 750 CAPSULE, EXTENDED RELEASE ORAL at 10:06

## 2017-01-01 RX ADMIN — GABAPENTIN 600 MG: 300 CAPSULE ORAL at 02:08

## 2017-01-01 RX ADMIN — HYDROCHLOROTHIAZIDE 25 MG: 25 TABLET ORAL at 09:58

## 2017-01-01 RX ADMIN — LEVETIRACETAM 1500 MG: 500 TABLET, FILM COATED ORAL at 08:21

## 2017-06-22 PROBLEM — N39.0 URINARY TRACT INFECTION WITHOUT HEMATURIA: Status: ACTIVE | Noted: 2017-01-01

## 2017-06-23 PROBLEM — R56.9 SEIZURES (HCC): Chronic | Status: ACTIVE | Noted: 2017-01-01

## 2017-06-23 PROBLEM — G35 MS (MULTIPLE SCLEROSIS) (HCC): Chronic | Status: ACTIVE | Noted: 2017-01-01

## 2017-06-23 PROBLEM — I10 HYPERTENSION: Chronic | Status: ACTIVE | Noted: 2017-01-01

## 2017-06-24 PROBLEM — B96.20 E. COLI UTI: Status: ACTIVE | Noted: 2017-01-01

## 2017-06-24 PROBLEM — N39.0 E. COLI UTI: Status: ACTIVE | Noted: 2017-01-01

## 2017-06-24 PROBLEM — E87.6 HYPOKALEMIA: Status: ACTIVE | Noted: 2017-01-01

## 2017-12-06 PROBLEM — G40.909 SEIZURE DISORDER (HCC): Chronic | Status: ACTIVE | Noted: 2017-01-01

## 2017-12-06 PROBLEM — J10.1 INFLUENZA A: Status: ACTIVE | Noted: 2017-01-01

## 2017-12-06 PROBLEM — A41.9 SEPSIS (HCC): Status: ACTIVE | Noted: 2017-01-01

## 2017-12-06 PROBLEM — G35 MULTIPLE SCLEROSIS (HCC): Chronic | Status: ACTIVE | Noted: 2017-01-01

## 2017-12-06 PROBLEM — F03.90 DEMENTIA (HCC): Chronic | Status: ACTIVE | Noted: 2017-01-01

## 2017-12-06 NOTE — ED TRIAGE NOTES
From [facility], staff stated he had a sz lasting 30-40 sec , staff also stated he refused all meds today, baseline AO x3

## 2017-12-06 NOTE — PLAN OF CARE
Problem: Patient Care Overview (Adult)  Goal: Plan of Care Review  Outcome: Ongoing (interventions implemented as appropriate)    12/06/17 0401 12/06/17 0556   Patient Care Overview   Progress --  no change   Outcome Evaluation   Outcome Summary/Follow up Plan --  Admitted with sepsis from ER. Disoriented x4. Stuttering, hard to communicate. C/o HA, tylenol given. Bed bound at [facility] home. No falls. No SOA. VSS, sinustachy. Will continue to monitor.    Coping/Psychosocial Response Interventions   Plan Of Care Reviewed With patient --          Problem: Seizure Disorder/Epilepsy (Adult)  Goal: Signs and Symptoms of Listed Potential Problems Will be Absent or Manageable (Seizure Disorder/Epilepsy)  Outcome: Ongoing (interventions implemented as appropriate)    12/06/17 0556   Seizure Disorder/Epilepsy   Problems Assessed (Seizure Disorder/Epilepsy) all         Problem: Sepsis (Adult)  Goal: Signs and Symptoms of Listed Potential Problems Will be Absent or Manageable (Sepsis)  Outcome: Ongoing (interventions implemented as appropriate)    12/06/17 0556   Sepsis   Problems Assessed (Sepsis) all   Problems Present (Sepsis) progression of infection

## 2017-12-06 NOTE — PLAN OF CARE
Problem: Multiple Sclerosis (Adult,Obstetrics,Pediatric)  Goal: Signs and Symptoms of Listed Potential Problems Will be Absent or Manageable (Multiple Sclerosis)    12/06/17 0380   Multiple Sclerosis   Problems Assessed (Multiple Sclerosis) all   Problems Present (Multiple Sclerosis) cognitive impairment;infection;speech impairment;bladder/bowel dysfunction;functional decline/self-care deficit

## 2017-12-06 NOTE — DISCHARGE PLACEMENT REQUEST
"Skinny Alfaro (52 y.o. Male)     Date of Birth Social Security Number Address Home Phone MRN    1965  [id number] [place] [id number] [phone] [id number]    Roman Catholic Marital Status          Mandaeism Single       Admission Date Admission Type Admitting Provider Attending Provider Department, Room/Bed    12/6/17 Emergency Nate Meza MD [place], Marlon R, MD [address], 515/1    Discharge Date Discharge Disposition Discharge Destination                      Attending Provider: Nate Meza MD     Allergies:  Baclofen, Benadryl [Diphenhydramine Hcl (Sleep)], Phenobarbital, Phenytoin    Isolation:  Droplet   Infection:  Influenza (12/06/17)   Code Status:  Not on file    Ht:  175.3 cm (69\")   Wt:  98.7 kg (217 lb 9.6 oz)    Admission Cmt:  None   Principal Problem:  Influenza A [J10.1]                 Active Insurance as of 12/6/2017     Primary Coverage     Payor Plan Insurance Group Employer/Plan Group    MEDICARE MEDICARE A & B      Payor Plan Address Payor Plan Phone Number Effective From Effective To    [address] [phone] 12/1/1987     [place] [address]       Subscriber Name Subscriber Birth Date Member ID       SKINNY ALFARO 1965 [id number]           Secondary Coverage     Payor Plan Insurance Group Employer/Plan Group    [place]      Payor Plan Address Payor Plan Phone Number Effective From Effective To    [address] [phone] 6/22/2017     [place] [address]       Subscriber Name Subscriber Birth Date Member ID       SKINNY ALFARO 1965 [id number]                 Emergency Contacts      (Rel.) Home Phone Work Phone Mobile Phone    De OliveiraNathalia calle (Relative) [phone] -- --              "

## 2017-12-06 NOTE — ED NOTES
Ms. Frankel at [facility] notified of pt's positive Influenza A.     Marianne Owen RN  12/06/17 2201

## 2017-12-06 NOTE — THERAPY EVALUATION
Acute Care - Speech Language Pathology   Swallow Initial Evaluation [facility]     Patient Name: Omero Velazquez  : 1965  MRN: [id number]  Today's Date: 2017               Admit Date: 2017  SPEECH-LANGUAGE PATHOLOGY EVALUATION - SWALLOW  Subjective: The patient was seen on this date for a Clinical Swallow evaluation.  Patient was alert and cooperative, required encouragement for continued participation throughout eval. Pt agreeable to limited trials this date. Pt with characteristics of expressive aphasia.   The patient's history is significant for influenza A, MS, HTN, Seizures, dementia.   Objective: Textures given included thin liquid, nectar thick liquid, puree consistency, mechanical soft consistency and regular consistency.  Assessment: Difficulties were noted with thin liquid, characterized by delayed coughing.  SLP Findings:  Patient presents with mild oropharyngeal dysphagia, without esophageal component.   Recommendations: Diet Textures: nectar thick liquid, regular consistency food.  Medications should be taken whole with thickened liquids or puree. May have water and ice between meals after oral care, under staff or family supervision and with the recommended strategies for safe swallowing.   Recommended Strategies: Upright for PO, small bites and sips and no straw. Oral care before breakfast, after all meals and PRN.  Other Recommended Evaluations: Re-evaluation at bedside    Dysphagia therapy is recommended. Rationale: Tolerate least restrictive diet.    Visit Dx:     ICD-10-CM ICD-9-CM   1. Sepsis, due to unspecified organism A41.9 038.9     995.91   2. Influenza A J10.1 487.1     Patient Active Problem List   Diagnosis   • Urinary tract infection without hematuria   • MS (multiple sclerosis)   • Seizures   • Hypertension   • Hypokalemia   • E. coli UTI   • Sepsis   • Multiple sclerosis   • Seizure disorder   • Influenza A   • Dementia     Past Medical History:   Diagnosis Date    • Dementia    • GERD (gastroesophageal reflux disease)    • Hypertension    • MS (multiple sclerosis)    • Seizures      History reviewed. No pertinent surgical history.       SWALLOW EVALUATION (last 72 hours)      Swallow Evaluation       12/06/17 1430                Rehab Evaluation    Document Type evaluation  -OC        Subjective Information agree to therapy  -OC        Patient Effort, Rehab Treatment good  -OC        Symptoms Noted During/After Treatment none  -OC        General Information    Patient Profile Review yes  -OC        Current Diet Limitations NPO  -OC        Prior Level of Function- Communication other (comment)   aphasia  -OC        Prior Level of Function- Swallowing no diet consistency restrictions;other (comment)   per RN, reg/tl at [facility]  -OC        Plans/Goals Discussed With patient  -OC        Barriers to Rehab cognitive status;language barrier  -OC        Clinical Impression    Patient's Goals For Discharge patient could not state  -OC        SLP Swallowing Diagnosis mild dysphagia  -OC        Rehab Potential/Prognosis, Swallowing good, to achieve stated therapy goals  -OC        Criteria for Skilled Therapeutic Interventions Met skilled criteria for dysphagia intervention met  -OC        FCM, Swallowing 5-->Level 5  -OC        Therapy Frequency PRN  -OC        Predicted Duration Therapy Interv (days) until discharge  -OC        Expected Duration Therapy Session (min) 15-30 minutes  -OC        SLP Diet Recommendation regular textures;nectar/syrup-thick liquids  -OC        Recommended Feeding/Eating Techniques alternate between small bites and sips of food/liquid;maintain upright posture during/after eating for 30 mins;small sips/bites;no straws   supervision with PO  -OC        SLP Rec. for Method of Medication Administration meds whole with thickened liquid;meds whole in pudding/applesauce  -OC        Monitor For Signs Of Aspiration cough  -OC        Anticipated Discharge Disposition  placement for care  -OC        Pain Assessment    Pain Assessment Unable to assess  -OC        Cognitive Assessment/Intervention    Current Cognitive/Communication Assessment impaired  -OC        Follows Commands/Answers Questions unable/difficult to assess  -OC        Oral Motor Structure and Function    Oral Motor Anatomy and Physiology --   appears, WFL unable to assess   -OC          User Key  (r) = Recorded By, (t) = Taken By, (c) = Cosigned By    Initials Name Effective Dates    OC Sue Rivera MA,CCC-SLP 04/05/17 -         EDUCATION  The patient has been educated in the following areas:   Dysphagia (Swallowing Impairment) Modified Diet Instruction.    SLP Recommendation and Plan  SLP Swallowing Diagnosis: mild dysphagia  SLP Diet Recommendation: regular textures, nectar/syrup-thick liquids  Recommended Feeding/Eating Techniques: alternate between small bites and sips of food/liquid, maintain upright posture during/after eating for 30 mins, small sips/bites, no straws (supervision with PO)  SLP Rec. for Method of Medication Administration: meds whole with thickened liquid, meds whole in pudding/applesauce  Monitor For Signs Of Aspiration: cough     Criteria for Skilled Therapeutic Interventions Met: skilled criteria for dysphagia intervention met  Anticipated Discharge Disposition: placement for care  Rehab Potential/Prognosis, Swallowing: good, to achieve stated therapy goals  Therapy Frequency: PRN             Plan of Care Review  Plan Of Care Reviewed With: patient  Outcome Summary/Follow up Plan: bedside swallow eval completed.recommend nectar thick liquids with regular textues. meds whole with nectar thick or puree. water protcol ok.           IP SLP Goals       12/06/17 1430          Safely Consume Diet    Safely Consume Diet- SLP, Date Established 12/06/17  -OC      Safely Consume Diet- SLP, Time to Achieve by discharge  -OC      Safely Consume Diet- SLP, Outcome goal ongoing  -OC        User Key  (r) =  Recorded By, (t) = Taken By, (c) = Cosigned By    Initials Name Provider Type    KAVYA Rivera MA,CCC-SLP Speech and Language Pathologist             SLP Outcome Measures (last 72 hours)      SLP Outcome Measures       12/06/17 1430          SLP Outcome Measures    Outcome Measure Used? Adult NOMS  -OC      FCM Scores    FCM Chosen Swallowing  -OC      Swallowing FCM Score 5  -OC        User Key  (r) = Recorded By, (t) = Taken By, (c) = Cosigned By    Initials Name Effective Dates    KAVYA Rivera MA, CCC-SLP 04/05/17 -            Time Calculation:         Time Calculation- SLP       12/06/17 1520          Time Calculation- SLP    SLP Start Time 1315  -OC      SLP Stop Time 1415  -OC      SLP Time Calculation (min) 60 min  -OC      SLP Received On 12/06/17  -OC        User Key  (r) = Recorded By, (t) = Taken By, (c) = Cosigned By    Initials Name Provider Type    KAVYA Rivera MA,ADRIAN-SLP Speech and Language Pathologist          Therapy Charges for Today     Code Description Service Date Service Provider Modifiers Qty    [id number]  ST EVAL ORAL PHARYNG SWALLOW 4 12/6/2017 Sue Rivera MA,ADRIAN-SLP GN 1               Sue Rivera MA, CCC-SLP  12/6/2017

## 2017-12-06 NOTE — PLAN OF CARE
Problem: Patient Care Overview (Adult)  Goal: Plan of Care Review  Outcome: Ongoing (interventions implemented as appropriate)    12/06/17 1430   Outcome Evaluation   Outcome Summary/Follow up Plan bedside swallow eval completed.recommend nectar thick liquids with regular textues. meds whole with nectar thick or puree. water protocol ok.    Coping/Psychosocial Response Interventions   Plan Of Care Reviewed With patient         Problem: Inpatient SLP  Goal: Dysphagia- Patient will safely consume diet as per recommendation with no signs/symptoms of aspiration  Outcome: Ongoing (interventions implemented as appropriate)    12/06/17 1430   Safely Consume Diet   Safely Consume Diet- SLP, Date Established 12/06/17   Safely Consume Diet- SLP, Time to Achieve by discharge   Safely Consume Diet- SLP, Outcome goal ongoing

## 2017-12-06 NOTE — ED NOTES
Pt to room 15 via EMS stretcher from rehab for c/o seizure and fever.  Pt is poor historian, as he has dementia.  Pt is warm to the touch.  Pt is PERRLA, respirations are even and unlabored, chest rise and fall is equal in expansion.  Pt does not appear to be in distress at this time.  Bed in low position, call light within reach, side rails padded and up X2.             Aby Lang RN  12/06/17 0048

## 2017-12-06 NOTE — PROGRESS NOTES
Continued Stay Note  [facility]     Patient Name: Omero Velazquez  MRN: [id number]  Today's Date: 12/6/2017    Admit Date: 12/6/2017          Discharge Plan       12/06/17 1159    Case Management/Social Work Plan    Plan Return to [place]    Additional Comments IMM given 12-06-17 to Nathalia colvin--[id number]. Spoke with renuradha. Face sheet verified.  She states she is not POA but cares for patient and his mother. She sees that their bills are paid. She plans for patient to return  to [place] at discharge  and states he will need ambulance transport.. Packet started and left in [address] [place] [address] office. Spoke with Rachel / Gerald  to advise of admission and inquiry left in her in basket . Await her assessment for level of care        .... DENNISE Crandall              Discharge Codes     None            DENNISE Crandall

## 2017-12-06 NOTE — ED PROVIDER NOTES
EMERGENCY DEPARTMENT ENCOUNTER    CHIEF COMPLAINT  Chief Complaint: Seizures   History given by: NH report  History limited by: dementia, AMS  Room Number: 15/15  PMD: Nate Meza MD      HPI:  Pt is a 52 y.o. male who presents for evaluation of seizures. Per nursing home the pt has a hx of dementia and seizures, and refused all medications today. They report that they witnessed a 30-40 second seizure tonight. They also states that the pt has had a fever today. Temp on arrival to the ED is 100.8.Remainder of HPI is limited by AMS    Duration:  30-40 seconds  Onset: unknown  Timing: single seizure  Intensity/Severity: unknown  Progression: resolved   Associated Symptoms: fever  Aggravating Factors: unknown  Alleviating Factors: unknown  Previous Episodes: hx of seizures   Treatment before arrival: pt refused all medication today     PAST MEDICAL HISTORY  Active Ambulatory Problems     Diagnosis Date Noted   • Urinary tract infection without hematuria 06/22/2017   • MS (multiple sclerosis) 06/23/2017   • Seizures 06/23/2017   • Hypertension 06/23/2017   • Hypokalemia 06/24/2017   • E. coli UTI 06/24/2017     Resolved Ambulatory Problems     Diagnosis Date Noted   • No Resolved Ambulatory Problems     Past Medical History:   Diagnosis Date   • Dementia    • GERD (gastroesophageal reflux disease)    • Hypertension    • MS (multiple sclerosis)    • Seizures        PAST SURGICAL HISTORY  History reviewed. No pertinent surgical history.    FAMILY HISTORY  History reviewed. No pertinent family history.    SOCIAL HISTORY  Social History     Social History   • Marital status: Single     Spouse name: N/A   • Number of children: N/A   • Years of education: N/A     Occupational History   • Not on file.     Social History Main Topics   • Smoking status: Never Smoker   • Smokeless tobacco: Not on file   • Alcohol use No   • Drug use: Defer   • Sexual activity: Defer     Other Topics Concern   • Not on file     Social  History Narrative       ALLERGIES  Baclofen; Benadryl [diphenhydramine hcl (sleep)]; Phenobarbital; and Phenytoin    REVIEW OF SYSTEMS  Review of Systems   Unable to perform ROS: Other       PHYSICAL EXAM  ED Triage Vitals   Temp Heart Rate Resp BP SpO2   12/06/17 0027 12/06/17 0027 12/06/17 0027 12/06/17 0027 12/06/17 0027   100.8 °F (38.2 °C) 131 18 160/95 95 %      Temp src Heart Rate Source Patient Position BP Location FiO2 (%)   12/06/17 0027 12/06/17 0027 -- -- --   Tympanic Monitor          Physical Exam   Constitutional: He is well-developed, well-nourished, and in no distress.   HENT:   Head: Normocephalic and atraumatic.   Eyes: EOM are normal. Pupils are equal, round, and reactive to light.   Neck: Normal range of motion. Neck supple.   Cardiovascular: Regular rhythm and normal heart sounds.  Tachycardia present.    Pulmonary/Chest: Effort normal. No respiratory distress. He has rhonchi (coarse).   Abdominal: Soft. There is no tenderness. There is no rebound and no guarding.   Musculoskeletal: He exhibits no edema.   Contractures present, no edema, normal capillary refill.     Neurological: He is alert.   Incomprehensible speech   Skin: Skin is warm and dry.   Nursing note and vitals reviewed.      LAB RESULTS  Lab Results (last 24 hours)     Procedure Component Value Units Date/Time    CBC & Differential [[id number]] Collected:  12/06/17 0057    Specimen:  Blood Updated:  12/06/17 0135    Narrative:       The following orders were created for panel order CBC & Differential.  Procedure                               Abnormality         Status                     ---------                               -----------         ------                     CBC Auto Differential[[id number]]        Abnormal            Final result                 Please view results for these tests on the individual orders.    Comprehensive Metabolic Panel [[id number]]  (Abnormal) Collected:  12/06/17 0057    Specimen:  Blood Updated:   "12/06/17 0153     Glucose 144 (H) mg/dL      BUN 12 mg/dL      Creatinine 0.77 mg/dL      Sodium 140 mmol/L      Potassium 3.5 mmol/L      Chloride 95 (L) mmol/L      CO2 26.3 mmol/L      Calcium 9.7 mg/dL      Total Protein 7.6 g/dL      Albumin 4.30 g/dL      ALT (SGPT) 29 U/L      AST (SGOT) 19 U/L      Alkaline Phosphatase 137 (H) U/L      Total Bilirubin 0.2 mg/dL      eGFR Non African Amer 107 mL/min/1.73      Globulin 3.3 gm/dL      A/G Ratio 1.3 g/dL      BUN/Creatinine Ratio 15.6     Anion Gap 18.7 mmol/L     Lactic Acid, Plasma [[id number]]  (Abnormal) Collected:  12/06/17 0057    Specimen:  Blood Updated:  12/06/17 0201     Lactate 7.5 (C) mmol/L     Blood Culture - Blood, [[id number]] Collected:  12/06/17 0057    Specimen:  Blood from Arm, Left Updated:  12/06/17 0124    Procalcitonin [[id number]]  (Abnormal) Collected:  12/06/17 0057    Specimen:  Blood Updated:  12/06/17 0200     Procalcitonin 0.05 (L) ng/mL     Narrative:       As a Marker for Sepsis (Non-Neonates):   1. <0.5 ng/mL represents a low risk of severe sepsis and/or septic shock.  1. >2 ng/mL represents a high risk of severe sepsis and/or septic shock.    As a Marker for Lower Respiratory Tract Infections that require antibiotic therapy:  PCT on Admission     Antibiotic Therapy             6-12 Hrs later  > 0.5                Strongly Recommended            >0.25 - <0.5         Recommended  0.1 - 0.25           Discouraged                   Remeasure/reassess PCT  <0.1                 Strongly Discouraged          Remeasure/reassess PCT      As 28 day mortality risk marker: \"Change in Procalcitonin Result\" (> 80 % or <=80 %) if Day 0 (or Day 1) and Day 4 values are available. Refer to http://www.[employer]-pct-calculator.com/   Change in PCT <=80 %   A decrease of PCT levels below or equal to 80 % defines a positive change in PCT test result representing a higher risk for 28-day all-cause mortality of patients diagnosed with severe sepsis or " septic shock.  Change in PCT > 80 %   A decrease of PCT levels of more than 80 % defines a negative change in PCT result representing a lower risk for 28-day all-cause mortality of patients diagnosed with severe sepsis or septic shock.                CBC Auto Differential [[id number]]  (Abnormal) Collected:  12/06/17 0057    Specimen:  Blood Updated:  12/06/17 0135     WBC 12.01 (H) 10*3/mm3      RBC 5.31 10*6/mm3      Hemoglobin 14.7 g/dL      Hematocrit 45.5 %      MCV 85.7 fL      MCH 27.7 pg      MCHC 32.3 (L) g/dL      RDW 14.6 (H) %      RDW-SD 45.9 fl      MPV 10.3 fL      Platelets 322 10*3/mm3      Neutrophil % 86.2 (H) %      Lymphocyte % 8.2 (L) %      Monocyte % 5.0 %      Eosinophil % 0.0 (L) %      Basophil % 0.2 %      Immature Grans % 0.4 %      Neutrophils, Absolute 10.35 (H) 10*3/mm3      Lymphocytes, Absolute 0.98 10*3/mm3      Monocytes, Absolute 0.60 10*3/mm3      Eosinophils, Absolute 0.00 10*3/mm3      Basophils, Absolute 0.03 10*3/mm3      Immature Grans, Absolute 0.05 (H) 10*3/mm3     Lactic Acid, Reflex Timer [[id number]] Collected:  12/06/17 0057    Specimen:  Blood Updated:  12/06/17 0201    Influenza Antigen, Rapid - Swab, Nasopharynx [[id number]]  (Abnormal) Collected:  12/06/17 0133    Specimen:  Swab from Nasopharynx Updated:  12/06/17 0202     Influenza A Ag, EIA Positive (A)     Influenza B Ag, EIA Negative          I ordered the above labs and reviewed the results    RADIOLOGY  XR Chest 1 View    (Results Pending)        I ordered the above noted radiological studies. Interpreted by radiologist. Reviewed by me in PACS.       PROCEDURES  Procedures      PROGRESS AND CONSULTS  ED Course     00:40  BP- 160/95 HR- (!) 131 Temp- (!) 100.8 °F (38.2 °C) (Tympanic) O2 sat- 95%  Pt with incomprehensible speech. Plan for labs.     00:43  Procalcitonin, UA, blood cultures, lactic acid, CMP, CBC, and CXR ordered. Keppra ordered to prevent further seizures.    00:45  30 ml/kg fluid bolus ordered.      01:05  Zosyn ordered for abx coverage. Flu swab ordered.     02:42  Call placed to Dr Meza for admission.     02:51  Discussed pt's case with Dr Meza (pt's PMD), who agrees to admit the pt.     MEDICAL DECISION MAKING  Results were reviewed/discussed with the patient and they were also made aware of online access. Pt also made aware that some labs, such as cultures, will not be resulted during ER visit and follow up with PMD is necessary.     MDM  Number of Diagnoses or Management Options  Influenza A:   Sepsis, due to unspecified organism:      Amount and/or Complexity of Data Reviewed  Clinical lab tests: ordered and reviewed (Influenza A=postive, Lactate=7.5, WBC=12.01)  Tests in the radiology section of CPT®: ordered and reviewed  Discuss the patient with other providers: yes (Dr Meza, pt's PMD)    Patient Progress  Patient progress: stable         DIAGNOSIS  Final diagnoses:   Sepsis, due to unspecified organism   Influenza A       DISPOSITION  ADMISSION    Discussed treatment plan and reason for admission with pt/family and admitting physician.  Pt/family voiced understanding of the plan for admission for further testing/treatment as needed.     Latest Documented Vital Signs:  As of 2:54 AM  BP- 146/85 HR- 107 Temp- (!) 100.7 °F (38.2 °C) (Tympanic) O2 sat- 98%    --  Documentation assistance provided by trice Carbajal for Dr Easley.  Information recorded by the trice was done at my direction and has been verified and validated by me.         Isabel Carbajal  12/06/17 0254       Jorge Luis Easley MD  12/06/17 0515

## 2017-12-06 NOTE — PROGRESS NOTES
"Pharmacy Consult - Piperacillin/tazobactam Dosing  Omero Velazquez is on day 1 pharmacy to dose for sepsis.  Pharmacy dosing per Dr. Meza's request.   Admit date: 12/6/2017 12:30 AM    Relevant clinical data and objective history reviewed:  52 y.o. male 175.3 cm (69\") 98.7 kg (217 lb 9.6 oz)    Past Medical History:   Diagnosis Date   • Dementia    • GERD (gastroesophageal reflux disease)    • Hypertension    • MS (multiple sclerosis)    • Seizures      Creatinine   Date Value Ref Range Status   12/06/2017 0.77 0.76 - 1.27 mg/dL Final     BUN   Date Value Ref Range Status   12/06/2017 12 6 - 20 mg/dL Final     Estimated Creatinine Clearance: 131.5 mL/min (by C-G formula based on Cr of 0.77).    Lab Results   Component Value Date    WBC 12.01 (H) 12/06/2017    WBC 6.34 06/26/2017    WBC 6.30 06/25/2017     Temp Readings from Last 3 Encounters:   12/06/17 99.3 °F (37.4 °C) (Oral)   06/26/17 98 °F (36.7 °C) (Oral)   12/09/16 97.6 °F (36.4 °C) (Tympanic)     Baseline cultures/source/suscetibilit/labs/radiology:  12/6 BCx: pending  12/6 Influenza A positive     Anti-Infectives     Ordered     Dose/Rate Route Frequency Start Stop    12/06/17 0838  piperacillin-tazobactam (ZOSYN) 3.375 g in iso-osmotic dextrose 50 ml (premix)     Ordering Provider:  Nate Meza MD    3.375 g  over 4 Hours Intravenous Every 8 Hours 12/06/17 0930 12/11/17 0129    12/06/17 0825  oseltamivir (TAMIFLU) capsule 75 mg     Ordering Provider:  Nate Meza MD    75 mg Oral Every 12 Hours Scheduled 12/06/17 0900 12/11/17 0859    12/06/17 0825  Pharmacy to Dose Zosyn     Ordering Provider:  Nate Meza MD     Does not apply Continuous PRN 12/06/17 0824 12/11/17 0823    12/06/17 0241  oseltamivir (TAMIFLU) capsule 75 mg     Ordering Provider:  Jorge Luis Easley MD    75 mg Oral Once 12/06/17 0243 12/06/17 0316    12/06/17 0106  piperacillin-tazobactam (ZOSYN) 3.375 g in iso-osmotic dextrose 50 ml (premix)     Ordering Provider:  Jorge Luis" MD Tracee    3.375 g Intravenous Once 12/06/17 0108 12/06/17 0220         Assessment/Plan  1. Will initiate piperacillin/tazobactam 3.375 gm iv Q8H extended infusion given CrCl > 20 mL/min.  2. Will monitor renal function. Thank you Dr. Meza for the consult and pharmacy will continue to follow daily and adjust as needed.     Thank you,    Kamilla Lindre, Pharm.D.  12/06/17 8:38 AM

## 2017-12-06 NOTE — H&P
"HISTORY AND PHYSICAL   [facility], [facility]      2017    Patient Identification:    Name: Omero Velazquez  Age: 52 y.o.  Sex: male  :  1965  MRN: [id number]                       Primary Care Physician: Nate Meza MD    Chief Complaint:  Influenza A; Sepsis    Chief Complaint   Patient presents with   • Seizures         History of Present Illness:     Mr. Omero Velazquez is a 52 year old male resident of a NH with a prior medical history of MS, HTN, Seizure DO and dementia who refused all of his medications yesterday. Around 9:00 PM the on-call NP was notified that he had 30-second witnessed seizure. In addition, she reported that he had had a low grade fever all day, and that his HR was \"140's \". He was sent to the ED where he was found to have lactate 7.5, WBC 12.01 with positive influenza screen. He continued to have low grade fever as well as tachycardia. He was admitted for further evaluation and treatment.  This morning he is awake and alert in his room, but very confused. He has been somewhat resistant to care, but, so far, has been able to be redirected. Nursing reports no vomiting or diarrhea. He appears to be in no acute distress.     Past Medical History:  Past Medical History:   Diagnosis Date   • Dementia    • GERD (gastroesophageal reflux disease)    • Hypertension    • MS (multiple sclerosis)    • Seizures      Past Surgical History:  History reviewed. No pertinent surgical history.   Home Meds:  Prescriptions Prior to Admission   Medication Sig Dispense Refill Last Dose   • atorvastatin (LIPITOR) 40 MG tablet Take 80 mg by mouth.      • castor oil-balsam peru (VENELEX) ointment Apply  topically Every Morning. Apply to face and bilateral elbows every morning      • CloNIDine (CATAPRES) 0.1 MG tablet Take 0.1 mg by mouth 3 (Three) Times a Day As Needed for High Blood Pressure (sbp > 180 or dbp> 100).      • gabapentin (NEURONTIN) 600 MG tablet Take 600 mg by mouth 4 " (Four) Times a Day.      • hydrochlorothiazide (HYDRODIURIL) 25 MG tablet Take 25 mg by mouth Daily.      • hydrocortisone 1 % cream Apply  topically Daily. Apply to hemorrhoids once daily      • levETIRAcetam (KEPPRA) 500 MG tablet Take 1,000 mg by mouth 2 (Two) Times a Day.      • levETIRAcetam (KEPPRA) 750 MG tablet Take 500 mg by mouth 2 (Two) Times a Day.      • loratadine (CLARITIN) 10 MG tablet Take 10 mg by mouth Daily.      • mineral oil-hydrophilic petrolatum (AQUAPHOR) ointment Apply  topically Every Night. Apply to face everyday at bedtime      • ondansetron (ZOFRAN) 4 MG tablet Take 4 mg by mouth Every 6 (Six) Hours As Needed for Nausea or Vomiting.      • OXcarbazepine (TRILEPTAL) 600 MG tablet Take 1,200 mg by mouth 2 (Two) Times a Day.   12/5/2017 at Unknown time   • raNITIdine (ZANTAC) 150 MG tablet Take 150 mg by mouth Every Night.   12/5/2017 at Unknown time   • Skin Protectants, Misc. (EUCERIN) cream Apply  topically 2 (Two) Times a Day. Apply to bilateral lower extremities twice a day; knees to toes, cover with gauze; wrapping toes to knees.      • verapamil SR (CALAN-SR) 240 MG CR tablet Take 240 mg by mouth Every Night.   12/5/2017 at Unknown time   • raNITIdine (ZANTAC) 150 MG tablet TAKE 1 TABLET BY MOUTH at bedtime          Allergies:  Allergies   Allergen Reactions   • Baclofen    • Benadryl [Diphenhydramine Hcl (Sleep)]    • Phenobarbital    • Phenytoin      Immunizations:    There is no immunization history on file for this patient.  Social History:   Social History     Social History Narrative     Social History   Substance Use Topics   • Smoking status: Never Smoker   • Smokeless tobacco: Never Used   • Alcohol use No     Family History:  History reviewed. No pertinent family history.     Review of Systems  See history of present illness and past medical history.    Unable to complete ROS due to patient's confusion.   Remainder of ROS is negative.    Objective:    Exam:    tMax 24 hrs:  "Temp (24hrs), Av.9 °F (37.7 °C), Min:99.1 °F (37.3 °C), Max:100.8 °F (38.2 °C)    Vitals Ranges:   Temp:  [99.1 °F (37.3 °C)-100.8 °F (38.2 °C)] 99.6 °F (37.6 °C)  Heart Rate:  [] 86  Resp:  [18-20] 20  BP: (121-186)/() 121/75    /75 (BP Location: Right arm, Patient Position: Lying)  Pulse 86  Temp 99.6 °F (37.6 °C) (Oral)   Resp 20  Ht 175.3 cm (69\")  Wt 98.7 kg (217 lb 9.6 oz)  SpO2 96%  BMI 32.13 kg/m2    General: Alert, oriented x 1. Resistant, but can be redirected. no distress, appears stated age  HEENT:    Head: Normocephalic, without obvious abnormality, atraumatic  Eyes: EOM are normal. Pupils are equal, round, and reactive to light.   Oropharynx: Mucosa and tongue normal  Neck: Supple, symmetrical, trachea midline, no adenopathy;              thyroid:  no enlargement/tenderness/nodules;              no carotid bruit or JVD  Cardiovascular: Tachycardic, regular rhythm and intact distal pulses.              Exam reveals no gallop and no friction rub. No murmur heard  Chest wall: No tenderness or deformity  Pulmonary: Clear to auscultation bilaterally, respirations unlabored.               No rhonchi, wheezing or rales.   Abdominal: Soft. Soft, non-tender, bowel sounds active all four quadrants,     no masses, no hepatomegaly, no splenomegaly.   Extremities: Normal, atraumatic, no cyanosis or edema  Pulses: 2 + symmetric all extremities  Neurological: Patient is alert and oriented to person only this AM.                 CNII-XII intact, normal strength, sensation intact throughout  Skin: Skin color, texture, turgor normal; psoriatic rash on face, chronic      Data Review:      Results from last 7 days  Lab Units 17  0057   WBC 10*3/mm3 12.01*   HEMOGLOBIN g/dL 14.7   HEMATOCRIT % 45.5   PLATELETS 10*3/mm3 322         Results from last 7 days  Lab Units 17  0057   SODIUM mmol/L 140   POTASSIUM mmol/L 3.5   CHLORIDE mmol/L 95*   CO2 mmol/L 26.3   BUN mg/dL 12 "   CREATININE mg/dL 0.77   CALCIUM mg/dL 9.7   BILIRUBIN mg/dL 0.2   ALK PHOS U/L 137*   ALT (SGPT) U/L 29   AST (SGOT) U/L 19   GLUCOSE mg/dL 144*       Estimated Creatinine Clearance: 131.5 mL/min (by C-G formula based on Cr of 0.77).    Brief Urine Lab Results  (Last result in the past 365 days)      Color   Clarity   Blood   Leuk Est   Nitrite   Protein   CREAT   Urine HCG        12/06/17 0251 Yellow Clear Negative Negative Negative Negative                     No results found for: TROPONINT     Imaging Results (all)     Procedure Component Value Units Date/Time    XR Chest 1 View [[id number]] Updated:  12/06/17 0058          Assessment:    Principal Problem:    Influenza A  Active Problems:    Sepsis    Multiple sclerosis    Seizure disorder    Dementia      Patient Active Problem List   Diagnosis Code   • Urinary tract infection without hematuria N39.0   • MS (multiple sclerosis) G35   • Seizures R56.9   • Hypertension I10   • Hypokalemia E87.6   • E. coli UTI N39.0, B96.20   • Sepsis A41.9   • Multiple sclerosis G35   • Seizure disorder G40.909   • Influenza A J10.1   • Dementia F03.90       Plan:    Inpatient admission  IV fluids  IV antibiotics for sepsis, follow cx results  Tamiflu for influenza  Monitor and correct electrolytes  Monitor for seizures  Monitor mental status- while confused at baseline, he is normally quite cooperative and pleasant  Home medications  DVT/stress ulcer prophylaxis  PT consult when appropiate  Labs in am        Nate Meza MD  12/6/2017

## 2017-12-07 NOTE — PROGRESS NOTES
"DAILY PROGRESS NOTE  [facility], [facility]    2017    Patient Identification:  Name: Omero Velazquez  Age: 52 y.o.  Sex: male  :  1965  MRN: [id number]           Primary Care Physician: Nate Meza MD    Subjective:    Interval History:    Mr. Velazquez is sitting up in bed this morning, awake and alert. Back to baseline mentation- pleasant, talking. He continue with day 2 zosyn and tamiflu. Hi potassium is low. He has had low grade temp through the night.     ROS:     Denies CP, SOA, N/V/D    Objective:    Scheduled Meds:    atorvastatin 80 mg Oral Daily   enoxaparin 40 mg Subcutaneous Q24H   famotidine 20 mg Oral BID   levETIRAcetam 1,000 mg Oral BID   levETIRAcetam 500 mg Oral BID   oseltamivir 75 mg Oral Q12H   OXcarbazepine 1,200 mg Oral BID   piperacillin-tazobactam 3.375 g Intravenous Q8H   potassium chloride 40 mEq Oral BID   verapamil  mg Oral Nightly       Continuous Infusions:    Pharmacy to Dose Zosyn        PRN Meds:  •  acetaminophen  •  ondansetron  •  Pharmacy to Dose Zosyn  •  sodium chloride    Intake/Output:    Intake/Output Summary (Last 24 hours) at 17 2352  Last data filed at 17 1318   Gross per 24 hour   Intake              360 ml   Output                0 ml   Net              360 ml         Exam:    tMax 24 hrs: Temp (24hrs), Av.1 °F (37.3 °C), Min:98.3 °F (36.8 °C), Max:99.8 °F (37.7 °C)    Vitals Ranges:   Temp:  [98.3 °F (36.8 °C)-99.8 °F (37.7 °C)] 99.8 °F (37.7 °C)  Heart Rate:  [] 101  Resp:  [16-18] 16  BP: (139-157)/() 150/101    BP (!) 150/101 (BP Location: Left arm, Patient Position: Lying)  Pulse 101  Temp 99.8 °F (37.7 °C) (Oral)   Resp 16  Ht 175.3 cm (69\")  Wt 98.4 kg (216 lb 14.4 oz)  SpO2 96%  BMI 32.03 kg/m2    Data Review:    General: Alert, oriented x 1. Cooperative,  no distress, appears stated age  HEENT:    Head: Normocephalic, without obvious abnormality, " atraumatic  Eyes: EOM are normal. Pupils are equal, round, and reactive to light.   Oropharynx: Mucosa and tongue normal  Neck: Supple, symmetrical, trachea midline, no adenopathy;              thyroid:  no enlargement/tenderness/nodules;              no carotid bruit or JVD  Cardiovascular: Tachycardic, regular rhythm and intact distal pulses.              Exam reveals no gallop and no friction rub. No murmur heard  Chest wall: No tenderness or deformity  Pulmonary: Clear to auscultation bilaterally, respirations unlabored.               No rhonchi, wheezing or rales.   Abdominal: Soft. Soft, non-tender, bowel sounds active all four quadrants,     no masses, no hepatomegaly, no splenomegaly.   Extremities: Normal, atraumatic, no cyanosis or edema  Pulses: 2 + symmetric all extremities  Neurological: Patient is alert and oriented to person only this AM.                 CNII-XII intact, normal strength, sensation intact throughout  Skin: Skin color, texture, turgor normal; psoriatic rash on face, chronic       Results from last 7 days  Lab Units 12/07/17  0537 12/06/17  0057   WBC 10*3/mm3 7.73 12.01*   HEMOGLOBIN g/dL 12.5* 14.7   HEMATOCRIT % 39.9* 45.5   PLATELETS 10*3/mm3 221 322         Results from last 7 days  Lab Units 12/07/17  0537 12/06/17  0057   SODIUM mmol/L 142 140   POTASSIUM mmol/L 3.0* 3.5   CHLORIDE mmol/L 105 95*   CO2 mmol/L 23.5 26.3   BUN mg/dL 8 12   CREATININE mg/dL 0.67* 0.77   CALCIUM mg/dL 8.1* 9.7   BILIRUBIN mg/dL  --  0.2   ALK PHOS U/L  --  137*   ALT (SGPT) U/L  --  29   AST (SGOT) U/L  --  19   GLUCOSE mg/dL 86 144*       Estimated Creatinine Clearance: 150.9 mL/min (by C-G formula based on Cr of 0.67).          No results found for: TROPONINT    Microbiology Results (last 10 days)     Procedure Component Value - Date/Time    Blood Culture - Blood, [[id number]]  (Normal) Collected:  12/06/17 0932    Lab Status:  Preliminary result Specimen:  Blood from Hand, Left Updated:  12/07/17  1001     Blood Culture No growth at 24 hours    Influenza Antigen, Rapid - Swab, Nasopharynx [[id number]]  (Abnormal) Collected:  12/06/17 0133    Lab Status:  Final result Specimen:  Swab from Nasopharynx Updated:  12/06/17 0202     Influenza A Ag, EIA Positive (A)     Influenza B Ag, EIA Negative    Blood Culture - Blood, [[id number]]  (Normal) Collected:  12/06/17 0057    Lab Status:  Preliminary result Specimen:  Blood from Arm, Left Updated:  12/07/17 0131     Blood Culture No growth at 24 hours           Imaging Results (last 72 hours)     Procedure Component Value Units Date/Time    XR Chest 1 View [[id number]] Collected:  12/06/17 0100     Updated:  12/06/17 2215    Narrative:       PORTABLE CHEST X-RAY     CLINICAL HISTORY: Simple sepsis protocol     COMPARISON: None.     FINDINGS: This is a repeat dictation, exam was originally dictated at  approximately 1:05 AM and was lost by powerscribe. Portable AP view of  the chest was obtained with overlying monitor leads in place. The lungs  are poorly aerated. The right diaphragm is elevated. No active airspace  disease, edema, or pleural fluid. Heart size is likely normal  considering poor inspiration and portable technique.             Impression:       Poor inspiration but without active disease by portable  imaging        This report was finalized on 12/6/2017 10:12 PM by Regan Christie MD.               Assessment:    Principal Problem:    Influenza A  Active Problems:    Sepsis    Multiple sclerosis    Seizure disorder    Dementia  Hypokalemia    Patient Active Problem List   Diagnosis Code   • Urinary tract infection without hematuria N39.0   • MS (multiple sclerosis) G35   • Seizures R56.9   • Hypertension I10   • Hypokalemia E87.6   • E. coli UTI N39.0, B96.20   • Sepsis A41.9   • Multiple sclerosis G35   • Seizure disorder G40.909   • Influenza A J10.1   • Dementia F03.90       Plan:      Continue IV antibiotics  Continue Tamiflu  Monitor and correct  electrolytes- replace potassium  Monitor mental status  Continue home medications  PT/ST consulted  DVT/stress ulcer prophylaxis  If better, may dc in am  Labs in am        Nate Meza MD  12/7/2017

## 2017-12-07 NOTE — THERAPY RE-EVALUATION
"Acute Care - Speech Language Pathology   Swallow Initial Evaluation [facility]     Patient Name: Omero Velazquez  : 1965  MRN: [id number]  Today's Date: 2017               Admit Date: 2017     SPEECH-LANGUAGE PATHOLOGY EVALUATION - SWALLOW  Subjective: The patient was seen on this date for a Clinical Swallow evaluation.  Pt with poor functional communication. .  \"The patient's history is significant for influenza A, MS, HTN, Seizures, dementia.\".  Objective: Textures given included thin liquid, nectar thick liquid and regular consistency.  Assessment: Difficulties were noted with thin liquid.  Observations:  oral holding and immediate cough with thins via cup with small sips paced by SLP, but presented by patient  SLP Findings:  Patient presents with mild oropharyngeal dysphagia, without esophageal component.   Recommendations: Diet Textures: nectar thick liquid, regular consistency food.  Medications should be taken whole with puree.  Recommended Strategies: monitor intake, Upright for PO and small bites and sips. Oral care before breakfast, after all meals and PRN.  Other Recommended Evaluations: Re-evaluation at bedside    Dysphagia therapy is recommended. Rationale: to monitor diet and upgrade as indicated.        Visit Dx:     ICD-10-CM ICD-9-CM   1. Sepsis, due to unspecified organism A41.9 038.9     995.91   2. Influenza A J10.1 487.1     Patient Active Problem List   Diagnosis   • Urinary tract infection without hematuria   • MS (multiple sclerosis)   • Seizures   • Hypertension   • Hypokalemia   • E. coli UTI   • Sepsis   • Multiple sclerosis   • Seizure disorder   • Influenza A   • Dementia     Past Medical History:   Diagnosis Date   • Dementia    • GERD (gastroesophageal reflux disease)    • Hypertension    • MS (multiple sclerosis)    • Seizures      History reviewed. No pertinent surgical history.       SWALLOW EVALUATION (last 72 hours)      Swallow Evaluation       17 " 1030 12/06/17 1430             Rehab Evaluation    Document Type re-evaluation  -SH evaluation  -OC       Subjective Information agree to therapy  -SH agree to therapy  -OC       Patient Effort, Rehab Treatment fair  -SH good  -OC       Symptoms Noted During/After Treatment none  -SH none  -OC       General Information    Patient Profile Review yes  -SH yes  -OC       Current Diet Limitations nectar thick liquids;regular solid  -SH NPO  -OC       Prior Level of Function- Communication other (comment)   No preference, poor functional speech  -SH other (comment)   aphasia  -OC       Prior Level of Function- Swallowing other (comment)   r/thin at NH  -SH no diet consistency restrictions;other (comment)   per RN, reg/tl at [facility]  -OC       Plans/Goals Discussed With patient  -SH patient  -OC       Barriers to Rehab cognitive status  -SH cognitive status;language barrier  -OC       Clinical Impression    Patient's Goals For Discharge patient did not state  -SH patient could not state  -OC       SLP Swallowing Diagnosis mild dysphagia  -SH mild dysphagia  -OC       Rehab Potential/Prognosis, Swallowing good, to achieve stated therapy goals  -SH good, to achieve stated therapy goals  -OC       Criteria for Skilled Therapeutic Interventions Met skilled criteria for dysphagia intervention met  -SH skilled criteria for dysphagia intervention met  -OC       FCM, Swallowing 5-->Level 5  -SH 5-->Level 5  -OC       Therapy Frequency PRN  -SH PRN  -OC       Predicted Duration Therapy Interv (days) until discharge  -SH until discharge  -OC       Expected Duration Therapy Session (min) 15-30 minutes  -SH 15-30 minutes  -OC       SLP Diet Recommendation regular textures;nectar/syrup-thick liquids  -SH regular textures;nectar/syrup-thick liquids  -OC       Recommended Feeding/Eating Techniques maintain upright posture during/after eating for 30 mins;other (see comments)   SUPERVISE WITH PO  -SH alternate between small bites and sips of  food/liquid;maintain upright posture during/after eating for 30 mins;small sips/bites;no straws   supervision with PO  -OC       SLP Rec. for Method of Medication Administration meds whole in pudding/applesauce  -SH meds whole with thickened liquid;meds whole in pudding/applesauce  -OC       Monitor For Signs Of Aspiration cough  -SH cough  -OC       Anticipated Discharge Disposition placement for care  - placement for care  -       Pain Assessment    Pain Assessment Unable to assess  -SH Unable to assess  -OC       Cognitive Assessment/Intervention    Current Cognitive/Communication Assessment  impaired  -OC       Follows Commands/Answers Questions  unable/difficult to assess  -OC       Oral Motor Structure and Function    Oral Motor Anatomy and Physiology  --   appears, WFL unable to assess   -OC       Dysphagia Treatment Objectives and Progress    Dysphagia Treatment Objectives Other 1  -SH        Dysphagia Other 1    Dysphagia Other 1 Objective Pt will tolerate thin liquids without overt s/s of asp.   -          User Key  (r) = Recorded By, (t) = Taken By, (c) = Cosigned By    Initials Name Effective Dates    OC Sue Rivera MA,[facility]-SLP 04/05/17 -      Irais Garces MS [facility]-SLP 07/13/17 -         EDUCATION  The patient has been educated in the following areas:   Modified Diet Instruction.    SLP Recommendation and Plan  SLP Swallowing Diagnosis: mild dysphagia  SLP Diet Recommendation: regular textures, nectar/syrup-thick liquids  Recommended Feeding/Eating Techniques: maintain upright posture during/after eating for 30 mins, other (see comments) (SUPERVISE WITH PO)  SLP Rec. for Method of Medication Administration: meds whole in pudding/applesauce  Monitor For Signs Of Aspiration: cough     Criteria for Skilled Therapeutic Interventions Met: skilled criteria for dysphagia intervention met  Anticipated Discharge Disposition: placement for care  Rehab Potential/Prognosis, Swallowing: good, to achieve stated  therapy goals  Therapy Frequency: PRN             Plan of Care Review  Plan Of Care Reviewed With: patient  Progress: no change  Outcome Summary/Follow up Plan: Re evaluation completed. Pt continues to cough with thins. Will follow at the bedside and re evaluate as patient improves. Continue current diet.           IP SLP Goals       12/07/17 1111 12/06/17 1430       Safely Consume Diet    Safely Consume Diet- SLP, Date Established  12/06/17  -OC     Safely Consume Diet- SLP, Time to Achieve  by discharge  -OC     Safely Consume Diet- SLP, Outcome goal ongoing  -SH goal ongoing  -OC       User Key  (r) = Recorded By, (t) = Taken By, (c) = Cosigned By    Initials Name Provider Type    OC Sue Rivera MA,[facility]-SLP Speech and Language Pathologist     Irais Garces MS CCC-SLP Speech and Language Pathologist             SLP Outcome Measures (last 72 hours)      SLP Outcome Measures       12/07/17 1300 12/06/17 1430       SLP Outcome Measures    Outcome Measure Used? Adult NOMS  - Adult NOMS  -     FCM Scores    FCM Chosen Swallowing  - Swallowing  -     Swallowing FCM Score 5  - 5  -OC       User Key  (r) = Recorded By, (t) = Taken By, (c) = Cosigned By    Initials Name Effective Dates    OC Sue Rivera MA,CCC-SLP 04/05/17 -      Irais Garces MS CCC-SLP 07/13/17 -            Time Calculation:         Time Calculation- SLP       12/07/17 1300          Time Calculation- [facility]    SLP Start Time 1030  -      SLP Stop Time 1115  -      SLP Time Calculation (min) 45 min  -      SLP Received On 12/07/17  -        User Key  (r) = Recorded By, (t) = Taken By, (c) = Cosigned By    Initials Name Provider Type     Irais Garces MS CCC-SLP Speech and Language Pathologist          Therapy Charges for Today     Code Description Service Date Service Provider Modifiers Qty    [id number] HC ST TREATMENT SWALLOW 3 12/7/2017 Irais Garces MS CCC-SLP GN 1               Irais Garces MS CCC-RODRIGO  12/7/2017

## 2017-12-07 NOTE — PLAN OF CARE
Problem: Patient Care Overview (Adult)  Goal: Plan of Care Review  Outcome: Ongoing (interventions implemented as appropriate)    12/06/17 0556 12/07/17 0041 12/07/17 0652   Patient Care Overview   Progress no change --  --    Outcome Evaluation   Outcome Summary/Follow up Plan --  --  Pt tolerated pills with nectar thick liquids. C/o of HA x 1, tylenol given. Q2 turns. Incontinence care provided. Pt more verbal today. Will continue to monitor.    Coping/Psychosocial Response Interventions   Plan Of Care Reviewed With --  patient --          Problem: Seizure Disorder/Epilepsy (Adult)  Goal: Signs and Symptoms of Listed Potential Problems Will be Absent or Manageable (Seizure Disorder/Epilepsy)  Outcome: Ongoing (interventions implemented as appropriate)    12/06/17 0556   Seizure Disorder/Epilepsy   Problems Assessed (Seizure Disorder/Epilepsy) all         Problem: Sepsis (Adult)  Goal: Signs and Symptoms of Listed Potential Problems Will be Absent or Manageable (Sepsis)  Outcome: Ongoing (interventions implemented as appropriate)    12/06/17 0556   Sepsis   Problems Assessed (Sepsis) all   Problems Present (Sepsis) progression of infection         Problem: Multiple Sclerosis (Adult,Obstetrics,Pediatric)  Goal: Signs and Symptoms of Listed Potential Problems Will be Absent or Manageable (Multiple Sclerosis)  Outcome: Ongoing (interventions implemented as appropriate)    12/06/17 0555   Multiple Sclerosis   Problems Assessed (Multiple Sclerosis) all   Problems Present (Multiple Sclerosis) cognitive impairment;infection;speech impairment;bladder/bowel dysfunction;functional decline/self-care deficit

## 2017-12-07 NOTE — PLAN OF CARE
Problem: Patient Care Overview (Adult)  Goal: Plan of Care Review    12/07/17 1111   Patient Care Overview   Progress no change   Outcome Evaluation   Outcome Summary/Follow up Plan Re evaluation completed. Pt continues to cough with thins. Will follow at the bedside and re evaluate as patient improves. Continue current diet.    Coping/Psychosocial Response Interventions   Plan Of Care Reviewed With patient         Problem: Inpatient SLP  Goal: Dysphagia- Patient will safely consume diet as per recommendation with no signs/symptoms of aspiration    12/07/17 1111   Safely Consume Diet   Safely Consume Diet- SLP, Outcome goal ongoing

## 2017-12-08 NOTE — PROGRESS NOTES
"DAILY PROGRESS NOTE  [facility], [facility]    2017    Patient Identification:  Name: Omero Velazquez  Age: 52 y.o.  Sex: male  :  1965  MRN: [id number]           Primary Care Physician: Nate Meza MD    Subjective:    Interval History:    He is feeling better, mental status close to baseline.  Patient is sitting up in bed this morning, awake and alert.   He has difficult time keeping his IV site, no signs of bacterial infection at this time.    ROS:     Denies CP, SOA, N/V/D    Objective:    Scheduled Meds:    atorvastatin 80 mg Oral Nightly   enoxaparin 40 mg Subcutaneous Q24H   famotidine 20 mg Oral BID   levETIRAcetam 1,000 mg Oral BID   levETIRAcetam 500 mg Oral BID   oseltamivir 75 mg Oral Q12H   OXcarbazepine 1,200 mg Oral BID   piperacillin-tazobactam 3.375 g Intravenous Q8H   verapamil  mg Oral Nightly       Continuous Infusions:    Pharmacy to Dose Zosyn        PRN Meds:  •  acetaminophen  •  ondansetron  •  Pharmacy to Dose Zosyn  •  sodium chloride    Intake/Output:    Intake/Output Summary (Last 24 hours) at 17 1616  Last data filed at 17 1350   Gross per 24 hour   Intake              360 ml   Output                0 ml   Net              360 ml         Exam:    tMax 24 hrs: Temp (24hrs), Av.4 °F (37.4 °C), Min:98.9 °F (37.2 °C), Max:99.8 °F (37.7 °C)    Vitals Ranges:   Temp:  [98.9 °F (37.2 °C)-99.8 °F (37.7 °C)] 99.4 °F (37.4 °C)  Heart Rate:  [] 84  Resp:  [16-18] 18  BP: (128-157)/() 146/103    BP (!) 146/103 (BP Location: Left arm, Patient Position: Lying)  Pulse 84  Temp 99.4 °F (37.4 °C) (Oral)   Resp 18  Ht 175.3 cm (69\")  Wt 96 kg (211 lb 11.2 oz)  SpO2 96%  BMI 31.26 kg/m2    Data Review:    General: Alert, oriented x 1. Cooperative,  no distress, appears stated age  Neck: Supple, symmetrical, trachea midline, no adenopathy;              thyroid:  no enlargement/tenderness/nodules;           "    no carotid bruit or JVD  Cardiovascular: Regular rate and rhythm and intact distal pulses.              Exam reveals no gallop and no friction rub. No murmur heard  Chest wall: No tenderness or deformity  Pulmonary: Clear to auscultation bilaterally, respirations unlabored.               No rhonchi, wheezing or rales.   Abdominal: Soft. Soft, non-tender, bowel sounds active all four quadrants,     no masses, no hepatomegaly, no splenomegaly.   Extremities: Normal, atraumatic, no cyanosis or edema  Pulses: 2 + symmetric all extremities  Neurological: Patient is alert and oriented to person.                 CNII-XII intact, normal strength, sensation intact throughout  Skin: Skin color, texture, turgor normal; psoriatic rash on face, chronic       Results from last 7 days  Lab Units 12/08/17 0618 12/07/17 0537 12/06/17  0057   WBC 10*3/mm3 8.19 7.73 12.01*   HEMOGLOBIN g/dL 13.3* 12.5* 14.7   HEMATOCRIT % 41.6 39.9* 45.5   PLATELETS 10*3/mm3 245 221 322         Results from last 7 days  Lab Units 12/08/17 0618 12/07/17  0537 12/06/17  0057   SODIUM mmol/L 143 142 140   POTASSIUM mmol/L 3.5 3.0* 3.5   CHLORIDE mmol/L 107 105 95*   CO2 mmol/L 23.6 23.5 26.3   BUN mg/dL 8 8 12   CREATININE mg/dL 0.57* 0.67* 0.77   CALCIUM mg/dL 8.4* 8.1* 9.7   BILIRUBIN mg/dL  --   --  0.2   ALK PHOS U/L  --   --  137*   ALT (SGPT) U/L  --   --  29   AST (SGOT) U/L  --   --  19   GLUCOSE mg/dL 90 86 144*       Estimated Creatinine Clearance: 175.2 mL/min (by C-G formula based on Cr of 0.57).          No results found for: TROPONINT    Microbiology Results (last 10 days)     Procedure Component Value - Date/Time    Blood Culture - Blood, [[id number]]  (Normal) Collected:  12/06/17 0932    Lab Status:  Preliminary result Specimen:  Blood from Hand, Left Updated:  12/08/17 1001     Blood Culture No growth at 2 days    Influenza Antigen, Rapid - Swab, Nasopharynx [[id number]]  (Abnormal) Collected:  12/06/17 0133    Lab Status:  Final  result Specimen:  Swab from Nasopharynx Updated:  12/06/17 0202     Influenza A Ag, EIA Positive (A)     Influenza B Ag, EIA Negative    Blood Culture - Blood, [[id number]]  (Normal) Collected:  12/06/17 0057    Lab Status:  Preliminary result Specimen:  Blood from Arm, Left Updated:  12/08/17 0131     Blood Culture No growth at 2 days           Imaging Results (last 72 hours)     Procedure Component Value Units Date/Time    XR Chest 1 View [[id number]] Collected:  12/06/17 0100     Updated:  12/06/17 2215    Narrative:       PORTABLE CHEST X-RAY     CLINICAL HISTORY: Simple sepsis protocol     COMPARISON: None.     FINDINGS: This is a repeat dictation, exam was originally dictated at  approximately 1:05 AM and was lost by powerscribe. Portable AP view of  the chest was obtained with overlying monitor leads in place. The lungs  are poorly aerated. The right diaphragm is elevated. No active airspace  disease, edema, or pleural fluid. Heart size is likely normal  considering poor inspiration and portable technique.             Impression:       Poor inspiration but without active disease by portable  imaging        This report was finalized on 12/6/2017 10:12 PM by Regan Christie MD.               Assessment:    Principal Problem:    Influenza A  Active Problems:    Sepsis    Multiple sclerosis    Seizure disorder    Dementia  Hypokalemia    Patient Active Problem List   Diagnosis Code   • Urinary tract infection without hematuria N39.0   • MS (multiple sclerosis) G35   • Seizures R56.9   • Hypertension I10   • Hypokalemia E87.6   • E. coli UTI N39.0, B96.20   • Sepsis A41.9   • Multiple sclerosis G35   • Seizure disorder G40.909   • Influenza A J10.1   • Dementia F03.90       Plan:    No signs of bacterial infection, will check CXR, dc Zosyn for now.  Continue Tamiflu  Monitor and correct electrolytes- replace potassium  Monitor mental status  Encourage po intake  Continue home medications  PT/ST seeing  patient  DVT/stress ulcer prophylaxis  Labs in            Nate Meza MD  12/8/2017

## 2017-12-08 NOTE — THERAPY EVALUATION
Acute Care - Physical Therapy Initial Evaluation  [facility]     Patient Name: Omero Velazquez  : 1965  MRN: [id number]  Today's Date: 2017   Onset of Illness/Injury or Date of Surgery Date: (P) 17  Date of Referral to PT: (P) 17  Referring Physician: Meza (P)      Admit Date: 2017     Visit Dx:    ICD-10-CM ICD-9-CM   1. Sepsis, due to unspecified organism A41.9 038.9     995.91   2. Influenza A J10.1 487.1   3. Generalized weakness R53.1 780.79     Patient Active Problem List   Diagnosis   • Urinary tract infection without hematuria   • MS (multiple sclerosis)   • Seizures   • Hypertension   • Hypokalemia   • E. coli UTI   • Sepsis   • Multiple sclerosis   • Seizure disorder   • Influenza A   • Dementia     Past Medical History:   Diagnosis Date   • Dementia    • GERD (gastroesophageal reflux disease)    • Hypertension    • MS (multiple sclerosis)    • Seizures      History reviewed. No pertinent surgical history.       PT ASSESSMENT (last 72 hours)      PT Evaluation       17 1336 17 1030    Rehab Evaluation    Document Type (P)  evaluation  - re-evaluation  -    Subjective Information (P)  agree to therapy;no complaints  - agree to therapy  -SH    Patient Effort, Rehab Treatment (P)  adequate  - fair  -SH    Symptoms Noted During/After Treatment (P)  fatigue  - none  -SH    Symptoms Noted Comment (P)  fatigue in BLE when standing.   -     General Information    Patient Profile Review (P)  yes  -MF     Onset of Illness/Injury or Date of Surgery Date (P)  17  -     Referring Physician (P)  Derrick  -     General Observations (P)  Sidelying upon entry. soft restraints on R UE. Pt awake and in no acute distress but confused.   -     Pertinent History Of Current Problem (P)  Pt admitted for Influenza from nursing home. PMHx of MS, dementia  -     Precautions/Limitations (P)  fall precautions  -     Prior Level of Function (P)  mod  assist:;gait;ADL's   difficult to assess, pt poor historian  -     Equipment Currently Used at Home (P)  wheelchair;walker, rolling  -     Plans/Goals Discussed With (P)  patient  -     Barriers to Rehab (P)  previous functional deficit;cognitive status  -     Living Environment    Lives With (P)  facility resident  -     Living Arrangements (P)  extended care facility  -     Home Accessibility (P)  no concerns  -     Stair Railings at Home (P)  none  -     Clinical Impression    Date of Referral to PT (P)  12/07/17  -     Patient/Family Goals Statement (P)  unknown at this time, pt confused and did not state goals  -     Criteria for Skilled Therapeutic Interventions Met (P)  yes;treatment indicated  -     Pathology/Pathophysiology Noted (Describe Specifically for Each System) (P)  musculoskeletal  -     Impairments Found (describe specific impairments) (P)  gait, locomotion, and balance;muscle performance  -     Rehab Potential (P)  fair, will monitor progress closely  -     Pain Assessment    Pain Assessment (P)  Unable to assess  - Unable to assess  -    Vision Assessment/Intervention    Visual Impairment (P)  WFL  -     Cognitive Assessment/Intervention    Current Cognitive/Communication Assessment (P)  impaired  -     Orientation Status (P)  oriented to;person  -     Follows Commands/Answers Questions (P)  able to follow single-step instructions;50% of the time;needs cueing  -     Personal Safety (P)  moderate impairment;decreased awareness, need for assist;decreased awareness, need for safety  -     Personal Safety Interventions (P)  fall prevention program maintained;gait belt;nonskid shoes/slippers when out of bed  -     ROM (Range of Motion)    General ROM (P)  no range of motion deficits identified  -     MMT (Manual Muscle Testing)    General MMT Assessment (P)  --   generalized weakness  -     General MMT Assessment Detail (P)  BUE/BLE 4/5 grossly assessed   -MF     Bed Mobility, Assessment/Treatment    Bed Mobility, Scoot/Bridge, [place] (P)  minimum assist (75% patient effort)  -MF     Bed Mob, Supine to Sit, [place] (P)  minimum assist (75% patient effort)  -MF     Bed Mob, Sit to Supine, [place] (P)  minimum assist (75% patient effort)  -MF     Bed Mobility, Safety Issues (P)  cognitive deficits limit understanding;decreased use of arms for pushing/pulling  -MF     Bed Mobility, Impairments (P)  strength decreased  -MF     Transfer Assessment/Treatment    Transfers, Sit-Stand [place] (P)  contact guard assist  -MF     Transfers, Stand-Sit [place] (P)  contact guard assist  -MF     Transfers, Sit-Stand-Sit, Assist Device (P)  rolling walker  -MF     Transfer, Safety Issues (P)  sequencing ability decreased;weight-shifting ability decreased;knees buckling  -MF     Transfer, Impairments (P)  strength decreased;motor control impaired  -MF     Transfer, Comment (P)  sit<>stand 3x, 1 side step towards HOB. pt fatigues very easily  -     Motor Skills/Interventions    Additional Documentation (P)  Balance Skills Training (Group)  -     Balance Skills Training    Sitting-Level of Assistance (P)  Close supervision  -     Sitting-Balance Support (P)  Feet supported  -     Standing-Level of Assistance (P)  Contact guard  -     Static Standing Balance Support (P)  assistive device  -MF     Positioning and Restraints    Pre-Treatment Position (P)  in bed  -MF     Post Treatment Position (P)  bed  -MF     In Bed (P)  supine;call light within reach;encouraged to call for assist;exit alarm on;with nsg  -       12/06/17 1430 12/06/17 1158    Rehab Evaluation    Document Type evaluation  -OC     Subjective Information agree to therapy  -OC     Patient Effort, Rehab Treatment good  -OC     Symptoms Noted During/After Treatment none  -OC     Living Environment    Lives With  facility resident   [facility]  -    Living Arrangements   extended care facility  -SG    Transportation Available  ambulance  -SG    Pain Assessment    Pain Assessment Unable to assess  -OC     Cognitive Assessment/Intervention    Current Cognitive/Communication Assessment impaired  -OC     Follows Commands/Answers Questions unable/difficult to assess  -OC       12/06/17 0404       General Information    Equipment Currently Used at Home wheelchair  -EN     Living Environment    Lives With facility resident  -EN     Living Arrangements extended care facility  -EN     Home Accessibility no concerns  -EN     Stair Railings at Home none  -EN     Type of Financial/Environmental Concern none  -EN     Transportation Available ambulance  -EN       User Key  (r) = Recorded By, (t) = Taken By, (c) = Cosigned By    Initials Name Provider Type    OC Sue Rivera MA,[facility]-SLP Speech and Language Pathologist    SH Irais Garces MS CCC-SLP Speech and Language Pathologist    EN Inessa Kyle, RN Registered Nurse    DENNISE Turner      Omero Berman, PT Student PT Student          Physical Therapy Education     Title: PT OT SLP Therapies (Active)     Topic: Physical Therapy (Active)     Point: Mobility training (Active)    Learning Progress Summary    Learner Readiness Method Response Comment Documented by Status   Patient Acceptance E,D NL,NR   12/08/17 1346 Active               Point: Body mechanics (Active)    Learning Progress Summary    Learner Readiness Method Response Comment Documented by Status   Patient Acceptance E,D NL,NR   12/08/17 1346 Active               Point: Precautions (Active)    Learning Progress Summary    Learner Readiness Method Response Comment Documented by Status   Patient Acceptance E,D NL,NR   12/08/17 1346 Active                      User Key     Initials Effective Dates Name Provider Type Discipline     09/18/17 -  Omero Berman, PT Student PT Student PT                PT Recommendation and Plan  Anticipated Discharge  Disposition: (P) extended care facility  Planned Therapy Interventions: (P) balance training, bed mobility training, gait training, home exercise program, patient/family education, strengthening, transfer training  PT Frequency: (P) 5 times/wk  Plan of Care Review  Plan Of Care Reviewed With: (P) patient  Outcome Summary/Follow up Plan: (P) Pt admitted for influenza from nursing home. Pt has history of multiple sclerosis and dementia. Pt demonstrates w/ cognitive impairments and weakness which limits funcitonal mobility and fatigues very easily w/ activity. Pt will need skilled acute PT to address impairments and to improve functional mobility. Recommended return back to ECF following d/c.           IP PT Goals       12/08/17 1346          Bed Mobility PT LTG    Bed Mobility PT LTG, Date Established (P)  12/08/17  -MF      Bed Mobility PT LTG, Time to Achieve (P)  5 - 7 days  -MF      Bed Mobility PT LTG, Activity Type (P)  all bed mobility  -MF      Bed Mobility PT LTG, [place] Level (P)  conditional independence  -MF      Bed Mobility PT Goal  LTG, Assist Device (P)  bed rails  -MF      Transfer Training 2 PT LTG    Transfer Training PT 2 LTG, Date Established (P)  12/08/17  -MF      Transfer Training PT 2 LTG, Time to Achieve (P)  5 - 7 days  -MF      Transfer Training PT 2 LTG, Activity Type (P)  all transfers  -MF      Transfer Training PT 2 LTG, [place] Level (P)  supervision required  -MF      Transfer Training PT 2 LTG, Assist Device (P)  walker, rolling  -MF      Gait Training PT LTG    Gait Training Goal PT LTG, Date Established (P)  12/08/17  -MF      Gait Training Goal PT LTG, Time to Achieve (P)  5 - 7 days  -MF      Gait Training Goal PT LTG, [place] Level (P)  minimum assist (75% patient effort)  -MF      Gait Training Goal PT LTG, Assist Device (P)  walker, rolling  -MF      Gait Training Goal PT LTG, Distance to Achieve (P)  10 ft  -MF        User Key  (r) = Recorded By, (t) = Taken  By, (c) = Cosigned By    Initials Name Provider Type     Omero Berman, PT Student PT Student                Outcome Measures       12/08/17 1351          How much help from another person do you currently need...    Turning from your back to your side while in flat bed without using bedrails? (P)  3  -MF      Moving from lying on back to sitting on the side of a flat bed without bedrails? (P)  3  -MF      Moving to and from a bed to a chair (including a wheelchair)? (P)  2  -MF      Standing up from a chair using your arms (e.g., wheelchair, bedside chair)? (P)  3  -MF      Climbing 3-5 steps with a railing? (P)  2  -MF      To walk in hospital room? (P)  2  -MF      AM-PAC 6 Clicks Score (P)  15  -      Functional Assessment    Outcome Measure Options (P)  AM-PAC 6 Clicks Basic Mobility (PT)  -        User Key  (r) = Recorded By, (t) = Taken By, (c) = Cosigned By    Initials Name Provider Type     Omero Berman, PT Student PT Student           Time Calculation:         PT Charges       12/08/17 1351          Time Calculation    Start Time (P)  1319  -      Stop Time (P)  1334  -      Time Calculation (min) (P)  15 min  -      PT Received On (P)  12/08/17  -      PT - Next Appointment (P)  12/11/17  -      PT Goal Re-Cert Due Date (P)  12/15/17  -        User Key  (r) = Recorded By, (t) = Taken By, (c) = Cosigned By    Initials Name Provider Type     Omero Berman, PT Student PT Student          Therapy Charges for Today     Code Description Service Date Service Provider Modifiers Qty    62669667735 HC PT EVAL MOD COMPLEXITY 2 12/8/2017 Omero Berman, PT Student GP 1    33629814512 HC PT THER PROC EA 15 MIN 12/8/2017 Omero Berman, PT Student GP 1          PT G-Codes  Outcome Measure Options: (P) AM-PAC 6 Clicks Basic Mobility (PT)      Omero Berman PT Student  12/8/2017

## 2017-12-08 NOTE — PLAN OF CARE
Problem: Patient Care Overview (Adult)  Goal: Plan of Care Review  Outcome: Ongoing (interventions implemented as appropriate)    12/08/17 1808   Patient Care Overview   Progress improving   Outcome Evaluation   Outcome Summary/Follow up Plan Elevated blood pressure. BP meds resumed. Continue to monitor.    Coping/Psychosocial Response Interventions   Plan Of Care Reviewed With patient       Goal: Adult Individualization and Mutuality  Outcome: Ongoing (interventions implemented as appropriate)  Goal: Discharge Needs Assessment  Outcome: Ongoing (interventions implemented as appropriate)    Problem: Seizure Disorder/Epilepsy (Adult)  Goal: Signs and Symptoms of Listed Potential Problems Will be Absent or Manageable (Seizure Disorder/Epilepsy)  Outcome: Ongoing (interventions implemented as appropriate)    Problem: Sepsis (Adult)  Goal: Signs and Symptoms of Listed Potential Problems Will be Absent or Manageable (Sepsis)  Outcome: Ongoing (interventions implemented as appropriate)    Problem: Multiple Sclerosis (Adult,Obstetrics,Pediatric)  Goal: Signs and Symptoms of Listed Potential Problems Will be Absent or Manageable (Multiple Sclerosis)  Outcome: Ongoing (interventions implemented as appropriate)    Problem: Pressure Ulcer Risk (Alexandru Scale) (Adult,Obstetrics,Pediatric)  Goal: Identify Related Risk Factors and Signs and Symptoms  Outcome: Ongoing (interventions implemented as appropriate)  Goal: Skin Integrity  Outcome: Ongoing (interventions implemented as appropriate)    Problem: SAFETY - NON-VIOLENT RESTRAINT  Goal: Remains free of injury from restraints (Non-Violent Restraint)  Outcome: Outcome(s) achieved Date Met:  12/08/17  Goal: Free from restraint(s) (Non-Violent Restraint)  Outcome: Outcome(s) achieved Date Met:  12/08/17    Problem: Fall Risk (Adult)  Goal: Identify Related Risk Factors and Signs and Symptoms  Outcome: Ongoing (interventions implemented as appropriate)  Goal: Absence of  Falls  Outcome: Ongoing (interventions implemented as appropriate)    Problem: Pain, Acute (Adult)  Goal: Identify Related Risk Factors and Signs and Symptoms  Outcome: Ongoing (interventions implemented as appropriate)  Goal: Acceptable Pain Control/Comfort Level  Outcome: Ongoing (interventions implemented as appropriate)

## 2017-12-08 NOTE — PLAN OF CARE
Problem: Patient Care Overview (Adult)  Goal: Plan of Care Review  Outcome: Ongoing (interventions implemented as appropriate)    12/07/17 1259 12/08/17 0031 12/08/17 0530   Patient Care Overview   Progress no change --  --    Outcome Evaluation   Outcome Summary/Follow up Plan --  --  Pt much more alert today and pleasant, talking/interacting at baseline. Although, pulled out 2 IVs during night shift, and 2 during day shift. IV wrapped in coban in LA. RA in soft-wrist restraint. Q2 checks. Tolerating, no signs of injury. No falls. Low grade temp with mild HA, tylenol given. BP elevated, seems to be his NL. Will continue to monitor.    Coping/Psychosocial Response Interventions   Plan Of Care Reviewed With --  patient --          Problem: Seizure Disorder/Epilepsy (Adult)  Goal: Signs and Symptoms of Listed Potential Problems Will be Absent or Manageable (Seizure Disorder/Epilepsy)  Outcome: Ongoing (interventions implemented as appropriate)    12/06/17 0556 12/08/17 0530   Seizure Disorder/Epilepsy   Problems Assessed (Seizure Disorder/Epilepsy) all --    Problems Present (Seizure Disorder/Epilepsy) --  none         Problem: Sepsis (Adult)  Goal: Signs and Symptoms of Listed Potential Problems Will be Absent or Manageable (Sepsis)  Outcome: Ongoing (interventions implemented as appropriate)    12/06/17 0556   Sepsis   Problems Assessed (Sepsis) all   Problems Present (Sepsis) progression of infection         Problem: Multiple Sclerosis (Adult,Obstetrics,Pediatric)  Goal: Signs and Symptoms of Listed Potential Problems Will be Absent or Manageable (Multiple Sclerosis)  Outcome: Ongoing (interventions implemented as appropriate)    12/06/17 0555   Multiple Sclerosis   Problems Assessed (Multiple Sclerosis) all   Problems Present (Multiple Sclerosis) cognitive impairment;infection;speech impairment;bladder/bowel dysfunction;functional decline/self-care deficit         Problem: Pressure Ulcer Risk (Alexandru Scale)  (Adult,Obstetrics,Pediatric)  Goal: Identify Related Risk Factors and Signs and Symptoms  Outcome: Ongoing (interventions implemented as appropriate)    12/08/17 0530   Pressure Ulcer Risk (Alexandru Scale)   Related Risk Factors (Pressure Ulcer Risk (Alexandru Scale)) infection;mental impairment;mobility impaired       Goal: Skin Integrity  Outcome: Ongoing (interventions implemented as appropriate)    12/08/17 0530   Pressure Ulcer Risk (Alexandru Scale) (Adult,Obstetrics,Pediatric)   Skin Integrity making progress toward outcome         Problem: SAFETY - NON-VIOLENT RESTRAINT  Goal: Remains free of injury from restraints (Non-Violent Restraint)  Outcome: Ongoing (interventions implemented as appropriate)  Goal: Free from restraint(s) (Non-Violent Restraint)  Outcome: Ongoing (interventions implemented as appropriate)

## 2017-12-08 NOTE — PLAN OF CARE
Problem: Patient Care Overview (Adult)  Goal: Plan of Care Review    12/08/17 1346   Outcome Evaluation   Outcome Summary/Follow up Plan Pt admitted for influenza from nursing home. Pt has history of multiple sclerosis and dementia. Pt demonstrates w/ cognitive impairments and weakness which limits funcitonal mobility and fatigues very easily w/ activity. Pt will need skilled acute PT to address impairments and to improve functional mobility. Recommended return back to ECF following d/c.    Coping/Psychosocial Response Interventions   Plan Of Care Reviewed With patient         Problem: Inpatient Physical Therapy  Goal: Bed Mobility Goal LTG- PT    12/08/17 1346   Bed Mobility PT LTG   Bed Mobility PT LTG, Date Established 12/08/17   Bed Mobility PT LTG, Time to Achieve 5 - 7 days   Bed Mobility PT LTG, Activity Type all bed mobility   Bed Mobility PT LTG, [place] Level conditional independence   Bed Mobility PT Goal LTG, Assist Device bed rails       Goal: Transfer Training Goal 2 LTG- PT    12/08/17 1346   Transfer Training 2 PT LTG   Transfer Training PT 2 LTG, Date Established 12/08/17   Transfer Training PT 2 LTG, Time to Achieve 5 - 7 days   Transfer Training PT 2 LTG, Activity Type all transfers   Transfer Training PT 2 LTG, [place] Level supervision required   Transfer Training PT 2 LTG, Assist Device walker, rolling       Goal: Gait Training Goal LTG- PT    12/08/17 1346   Gait Training PT LTG   Gait Training Goal PT LTG, Date Established 12/08/17   Gait Training Goal PT LTG, Time to Achieve 5 - 7 days   Gait Training Goal PT LTG, [place] Level minimum assist (75% patient effort)   Gait Training Goal PT LTG, Assist Device walker, rolling   Gait Training Goal PT LTG, Distance to Achieve 10 ft

## 2017-12-09 NOTE — PLAN OF CARE
Problem: Patient Care Overview (Adult)  Goal: Plan of Care Review  Outcome: Ongoing (interventions implemented as appropriate)    12/09/17 0336   Patient Care Overview   Progress progress toward functional goals as expected   Outcome Evaluation   Outcome Summary/Follow up Plan VSS. BP elevated. Pt rested well throughout shift. Pt pleasantly confused. Pt ate most of his dinner tray. meds given per order. droplet precautions maintained. no c/o pain. no falls. will continue to monitor.   Coping/Psychosocial Response Interventions   Plan Of Care Reviewed With patient       Goal: Adult Individualization and Mutuality  Outcome: Ongoing (interventions implemented as appropriate)    12/09/17 0336   Individualization   Patient Specific Preferences pt likes soft drinks.    Patient Specific Goals no falls. no pain.    Patient Specific Interventions bed alarm. droplet precautions.        Goal: Discharge Needs Assessment    12/06/17 1158 12/08/17 1336 12/08/17 1808   Discharge Needs Assessment   Concerns To Be Addressed --  --  discharge planning concerns   Living Environment   Transportation Available ambulance --  --    Self-Care   Equipment Currently Used at Home --  wheelchair;walker, rolling --          Problem: Seizure Disorder/Epilepsy (Adult)  Goal: Signs and Symptoms of Listed Potential Problems Will be Absent or Manageable (Seizure Disorder/Epilepsy)  Outcome: Ongoing (interventions implemented as appropriate)    12/08/17 1808   Seizure Disorder/Epilepsy   Problems Assessed (Seizure Disorder/Epilepsy) all   Problems Present (Seizure Disorder/Epilepsy) none         Problem: Sepsis (Adult)  Goal: Signs and Symptoms of Listed Potential Problems Will be Absent or Manageable (Sepsis)  Outcome: Ongoing (interventions implemented as appropriate)    12/06/17 0556 12/09/17 0336   Sepsis   Problems Assessed (Sepsis) all --    Problems Present (Sepsis) --  situational response         Problem: Multiple Sclerosis  (Adult,Obstetrics,Pediatric)  Goal: Signs and Symptoms of Listed Potential Problems Will be Absent or Manageable (Multiple Sclerosis)  Outcome: Ongoing (interventions implemented as appropriate)    12/08/17 1808   Multiple Sclerosis   Problems Assessed (Multiple Sclerosis) cognitive impairment;functional decline/self-care deficit   Problems Present (Multiple Sclerosis) cognitive impairment         Problem: Pressure Ulcer Risk (Alexandru Scale) (Adult,Obstetrics,Pediatric)  Goal: Identify Related Risk Factors and Signs and Symptoms  Outcome: Ongoing (interventions implemented as appropriate)    12/09/17 0336   Pressure Ulcer Risk (Alexandru Scale)   Related Risk Factors (Pressure Ulcer Risk (Alexandru Scale)) cognitive impairment;fluid intake inadequate       Goal: Skin Integrity  Outcome: Ongoing (interventions implemented as appropriate)    12/09/17 0336   Pressure Ulcer Risk (Alexandru Scale) (Adult,Obstetrics,Pediatric)   Skin Integrity making progress toward outcome         Problem: Fall Risk (Adult)  Goal: Identify Related Risk Factors and Signs and Symptoms  Outcome: Ongoing (interventions implemented as appropriate)    12/08/17 1808   Fall Risk   Fall Risk: Related Risk Factors confusion/agitation   Fall Risk: Signs and Symptoms presence of risk factors       Goal: Absence of Falls  Outcome: Ongoing (interventions implemented as appropriate)    12/09/17 0336   Fall Risk (Adult)   Absence of Falls making progress toward outcome         Problem: Pain, Acute (Adult)  Goal: Identify Related Risk Factors and Signs and Symptoms  Outcome: Ongoing (interventions implemented as appropriate)    12/08/17 1808   Pain, Acute   Related Risk Factors (Acute Pain) knowledge deficit   Signs and Symptoms (Acute Pain) fatigue/weakness       Goal: Acceptable Pain Control/Comfort Level  Outcome: Ongoing (interventions implemented as appropriate)    12/09/17 0336   Pain, Acute (Adult)   Acceptable Pain Control/Comfort Level making progress  toward outcome

## 2017-12-09 NOTE — DISCHARGE SUMMARY
"PHYSICIAN DISCHARGE SUMMARY  [facility], [facility]    Patient Identification:    Name: Omero Velazquez  Age: 52 y.o.  Sex: male  :  1965  MRN: [id number]    Primary Care Physician: Nate Meza MD    Admit date: 2017    Discharge date and time:2017    Discharged Condition: fair    Discharge Diagnoses:  Principal Problem:    Influenza A  Active Problems:    Sepsis    Seizure disorder    Hypertension    Multiple sclerosis    Dementia    Patient Active Problem List   Diagnosis Code   • Urinary tract infection without hematuria N39.0   • MS (multiple sclerosis) G35   • Seizures R56.9   • Hypertension I10   • Hypokalemia E87.6   • E. coli UTI N39.0, B96.20   • Sepsis A41.9   • Multiple sclerosis G35   • Seizure disorder G40.909   • Influenza A J10.1   • Dementia F03.90          Hospital Course: Omero Velazquez  is a 52 year old male resident of a NH with a prior medical history of MS, HTN, Seizure DO and dementia who refused all of his medications yesterday. Around 9:00 PM the on-call NP was notified that he had 30-second witnessed seizure. In addition, she reported that he had had a low grade fever all day, and that his HR was \"140's \". He was sent to the ED where he was found to have lactate 7.5, WBC 12.01 with positive influenza screen. He continued to have low grade fever as well as tachycardia. He was admitted for further evaluation and treatment. The day after admission he was awake and alert in his room, but very confused.     He was initially given antibiotics as well as IV fluids and started on Tamiflu.  However, further studies done no signs of bacterial infection so antibiotics were discontinued.  IV fluid was continued for influenza a treatment.  He has been doing very well, his mental status is back to baseline.  Vital signs are stable.  I am rearranging some of his blood pressure medications.  He has been seizure-free during this " "hospitalization and without the gabapentin he has no have any neuropathic pain, we'll discontinue this and will monitor closely at the nursing home.    PMHX:   Past Medical History:   Diagnosis Date   • Dementia    • GERD (gastroesophageal reflux disease)    • Hypertension    • MS (multiple sclerosis)    • Seizures      PSHX: History reviewed. No pertinent surgical history.        Consults:     Consults     Date and Time Order Name Status Description    12/6/2017 0241 Family Medicine Consult Completed           Discharge Exam:    /84 (BP Location: Left arm, Patient Position: Lying)  Pulse 83  Temp 98.6 °F (37 °C) (Oral)   Resp 16  Ht 175.3 cm (69\")  Wt 96 kg (211 lb 11.2 oz)  SpO2 97%  BMI 31.26 kg/m2    General: Alert, oriented x 2. Cooperative,  no distress, appears stated age  Neck: Supple, symmetrical, trachea midline, no adenopathy;              thyroid:  no enlargement/tenderness/nodules;              no carotid bruit or JVD  Cardiovascular: Regular rate and rhythm and intact distal pulses.              Exam reveals no gallop and no friction rub. No murmur heard  Chest wall: No tenderness or deformity  Pulmonary: Clear to auscultation bilaterally, respirations unlabored.               No rhonchi, wheezing or rales.   Abdominal: Soft. Soft, non-tender, bowel sounds active all four quadrants,     no masses, no hepatomegaly, no splenomegaly.   Extremities: Normal, atraumatic, no cyanosis or edema  Pulses: 2 + symmetric all extremities  Neurological: Patient is alert and oriented to person.                 CNII-XII intact, normal strength, sensation intact throughout  Skin: Skin color, texture, turgor normal; psoriatic rash on face, chronic    Data Review:          Results from last 7 days  Lab Units 12/09/17  0531 12/08/17  0618 12/07/17  0537   WBC 10*3/mm3 8.70 8.19 7.73   HEMOGLOBIN g/dL 13.5* 13.3* 12.5*   HEMATOCRIT % 42.9 41.6 39.9*   PLATELETS 10*3/mm3 245 245 221         Results from last 7 " days  Lab Units 12/09/17  0531 12/08/17 0618 12/07/17  0537 12/06/17 0057   SODIUM mmol/L 144 143 142 140   POTASSIUM mmol/L 3.7 3.5 3.0* 3.5   CHLORIDE mmol/L 107 107 105 95*   CO2 mmol/L 22.9 23.6 23.5 26.3   BUN mg/dL 10 8 8 12   CREATININE mg/dL 0.69* 0.57* 0.67* 0.77   CALCIUM mg/dL 8.5* 8.4* 8.1* 9.7   BILIRUBIN mg/dL  --   --   --  0.2   ALK PHOS U/L  --   --   --  137*   ALT (SGPT) U/L  --   --   --  29   AST (SGOT) U/L  --   --   --  19   GLUCOSE mg/dL 80 90 86 144*           No results found for: TROPONINT    Microbiology Results (last 10 days)     Procedure Component Value - Date/Time    Blood Culture - Blood, [[id number]]  (Normal) Collected:  12/06/17 0932    Lab Status:  Preliminary result Specimen:  Blood from Hand, Left Updated:  12/09/17 1001     Blood Culture No growth at 3 days    Influenza Antigen, Rapid - Swab, Nasopharynx [[id number]]  (Abnormal) Collected:  12/06/17 0133    Lab Status:  Final result Specimen:  Swab from Nasopharynx Updated:  12/06/17 0202     Influenza A Ag, EIA Positive (A)     Influenza B Ag, EIA Negative    Blood Culture - Blood, [[id number]]  (Normal) Collected:  12/06/17 0057    Lab Status:  Preliminary result Specimen:  Blood from Arm, Left Updated:  12/09/17 0131     Blood Culture No growth at 3 days           Imaging Results (all)     Procedure Component Value Units Date/Time    XR Chest 1 View [[id number]] Collected:  12/06/17 0100     Updated:  12/06/17 2215    Narrative:       PORTABLE CHEST X-RAY     CLINICAL HISTORY: Simple sepsis protocol     COMPARISON: None.     FINDINGS: This is a repeat dictation, exam was originally dictated at  approximately 1:05 AM and was lost by powerscribe. Portable AP view of  the chest was obtained with overlying monitor leads in place. The lungs  are poorly aerated. The right diaphragm is elevated. No active airspace  disease, edema, or pleural fluid. Heart size is likely normal  considering poor inspiration and portable technique.              Impression:       Poor inspiration but without active disease by portable  imaging        This report was finalized on 12/6/2017 10:12 PM by Regan Christie MD.       XR Chest PA & Lateral [[id number]] Collected:  12/08/17 2052     Updated:  12/08/17 2052    Narrative:       PA AND LATERAL CHEST X-RAY 12/08/2017     CLINICAL HISTORY: Fever, flu, dementia.     COMPARISON: This is correlated to a prior chest x-ray from [facility] 12/06/2017.     FINDINGS: The cardiomediastinal silhouette and pulmonary vasculature are  within normal limits. The lungs are clear. The costophrenic angles are  sharp.       Impression:          1. No active disease is seen in the chest with no change when compared  to a prior chest x-ray 12/06/2017.               Disposition:    Skilled nursing facility    Patient Instructions:      Omero Velazquez   Home Medication Instructions SANTA:[id number]    Printed on:12/09/17 1033   Medication Information                      atorvastatin (LIPITOR) 40 MG tablet  Take 80 mg by mouth.             castor oil-balsam peru (VENELEX) ointment  Apply  topically Every Morning. Apply to face and bilateral elbows every morning             hydrocortisone 1 % cream  Apply  topically Daily. Apply to hemorrhoids once daily             levETIRAcetam (KEPPRA) 1000 MG tablet  Take 1 tablet by mouth 2 (Two) Times a Day.             levETIRAcetam (KEPPRA) 500 MG tablet  Take 1,000 mg by mouth 2 (Two) Times a Day.             levETIRAcetam (KEPPRA) 500 MG tablet  Take 1 tablet by mouth 2 (Two) Times a Day.             lisinopril (PRINIVIL,ZESTRIL) 10 MG tablet  Take 1 tablet by mouth Daily.             mineral oil-hydrophilic petrolatum (AQUAPHOR) ointment  Apply  topically Every Night. Apply to face everyday at bedtime             ondansetron (ZOFRAN) 4 MG tablet  Take 4 mg by mouth Every 6 (Six) Hours As Needed for Nausea or Vomiting.             oseltamivir (TAMIFLU) 75 MG capsule  Take  1 capsule by mouth Every 12 (Twelve) Hours for 2 days. Indications: Influenza A             OXcarbazepine (TRILEPTAL) 600 MG tablet  Take 1,200 mg by mouth 2 (Two) Times a Day.             raNITIdine (ZANTAC) 150 MG tablet  TAKE 1 TABLET BY MOUTH at bedtime             verapamil SR (CALAN-SR) 240 MG CR tablet  Take 240 mg by mouth Every Night.                 Discharge Order     Start     Ordered    12/09/17 1030  Discharge patient  Once     Expected Discharge Date:  12/09/17    Discharge Disposition:  Skilled Nursing Facility (DC - External)        12/09/17 1029          Follow-up Information     Follow up with [facility] .    Specialties:  Skilled Nursing Facility, Intermediate Care Facility    Contact information:    [address]  [facility] [address] [phone]        Follow up with Nate Meza MD .    Specialties:  Internal Medicine, Hospitalist    Contact information:    [address]  [place] [id number] [phone]            Total time spent discharging patient including evaluation,post hospitalization follow up,  medication and post hospitalization instructions and education total time exceeds 30 minutes.    Signed:  Nate Meza MD  12/9/2017  10:33 AM

## 2017-12-11 NOTE — PROGRESS NOTES
Case Management Discharge Note    Final Note: Patient returned to [place]    Discharge Placement     Facility/Agency Request Status Selected? Address Phone Number Fax Number    [facility] Accepted    [facility] [address] [facility] [address] [phone]        DENNISE Crandall 12/6/2017 11:05    Patient came from [place]                    Ambulance: [facility]    Discharge Codes: 03  Discharged/transferred to skilled nursing facility (SNF) with Medicare certification in anticipation of skilled care (Per Ekta)

## 2018-01-01 ENCOUNTER — APPOINTMENT (OUTPATIENT)
Dept: GENERAL RADIOLOGY | Facility: HOSPITAL | Age: 53
End: 2018-01-01

## 2018-01-01 ENCOUNTER — APPOINTMENT (OUTPATIENT)
Dept: CT IMAGING | Facility: HOSPITAL | Age: 53
End: 2018-01-01

## 2018-01-01 ENCOUNTER — HOSPITAL ENCOUNTER (INPATIENT)
Facility: HOSPITAL | Age: 53
LOS: 1 days | End: 2018-02-18
Attending: FAMILY MEDICINE | Admitting: INTERNAL MEDICINE

## 2018-01-01 VITALS
HEART RATE: 29 BPM | WEIGHT: 220.46 LBS | RESPIRATION RATE: 16 BRPM | TEMPERATURE: 98.1 F | OXYGEN SATURATION: 87 % | SYSTOLIC BLOOD PRESSURE: 64 MMHG | DIASTOLIC BLOOD PRESSURE: 45 MMHG

## 2018-01-01 DIAGNOSIS — K56.609 SMALL BOWEL OBSTRUCTION (HCC): ICD-10-CM

## 2018-01-01 DIAGNOSIS — R09.2 RESPIRATORY ARREST (HCC): Primary | ICD-10-CM

## 2018-01-01 DIAGNOSIS — K92.0 HEMATEMESIS, PRESENCE OF NAUSEA NOT SPECIFIED: ICD-10-CM

## 2018-01-01 DIAGNOSIS — J18.9 PNEUMONIA DUE TO INFECTIOUS ORGANISM, UNSPECIFIED LATERALITY, UNSPECIFIED PART OF LUNG: ICD-10-CM

## 2018-01-01 DIAGNOSIS — N19 RENAL FAILURE, UNSPECIFIED CHRONICITY: ICD-10-CM

## 2018-01-01 LAB
ABO GROUP BLD: NORMAL
ALBUMIN SERPL-MCNC: 3.5 G/DL (ref 3.5–5.2)
ALBUMIN/GLOB SERPL: 1 G/DL
ALP SERPL-CCNC: 83 U/L (ref 39–117)
ALT SERPL W P-5'-P-CCNC: 38 U/L (ref 1–41)
ANION GAP SERPL CALCULATED.3IONS-SCNC: 29.9 MMOL/L
APTT PPP: 23.9 SECONDS (ref 22.7–35.4)
AST SERPL-CCNC: 26 U/L (ref 1–40)
ATMOSPHERIC PRESS: 756.1 MMHG
BACTERIA UR QL AUTO: ABNORMAL /HPF
BASE EXCESS BLDV CALC-SCNC: -2.4 MMOL/L
BASOPHILS # BLD AUTO: 0.05 10*3/MM3 (ref 0–0.2)
BASOPHILS NFR BLD AUTO: 0.3 % (ref 0–1.5)
BDY SITE: ABNORMAL
BILIRUB SERPL-MCNC: 0.3 MG/DL (ref 0.1–1.2)
BILIRUB UR QL STRIP: NEGATIVE
BLD GP AB SCN SERPL QL: NEGATIVE
BUN BLD-MCNC: 74 MG/DL (ref 6–20)
BUN/CREAT SERPL: 17.5 (ref 7–25)
CALCIUM SPEC-SCNC: 8.8 MG/DL (ref 8.6–10.5)
CHLORIDE SERPL-SCNC: 97 MMOL/L (ref 98–107)
CLARITY UR: ABNORMAL
CO2 SERPL-SCNC: 19.1 MMOL/L (ref 22–29)
COLOR UR: ABNORMAL
CREAT BLD-MCNC: 4.24 MG/DL (ref 0.76–1.27)
DEPRECATED RDW RBC AUTO: 54.7 FL (ref 37–54)
EOSINOPHIL # BLD AUTO: 0.02 10*3/MM3 (ref 0–0.7)
EOSINOPHIL NFR BLD AUTO: 0.1 % (ref 0.3–6.2)
ERYTHROCYTE [DISTWIDTH] IN BLOOD BY AUTOMATED COUNT: 16.5 % (ref 11.5–14.5)
FIBRINOGEN PPP-MCNC: 418 MG/DL (ref 219–464)
GFR SERPL CREATININE-BSD FRML MDRD: 15 ML/MIN/1.73
GLOBULIN UR ELPH-MCNC: 3.4 GM/DL
GLUCOSE BLD-MCNC: 198 MG/DL (ref 65–99)
GLUCOSE UR STRIP-MCNC: NEGATIVE MG/DL
HCO3 BLDV-SCNC: 28.3 MMOL/L (ref 22–28)
HCT VFR BLD AUTO: 43.5 % (ref 40.4–52.2)
HCT VFR BLD AUTO: 51.1 % (ref 40.4–52.2)
HGB BLD-MCNC: 13.1 G/DL (ref 13.7–17.6)
HGB BLD-MCNC: 16 G/DL (ref 13.7–17.6)
HGB UR QL STRIP.AUTO: NEGATIVE
HOROWITZ INDEX BLD+IHG-RTO: 100 %
HYALINE CASTS UR QL AUTO: ABNORMAL /LPF
IMM GRANULOCYTES # BLD: 0.15 10*3/MM3 (ref 0–0.03)
IMM GRANULOCYTES NFR BLD: 1 % (ref 0–0.5)
INR PPP: 1.27 (ref 0.9–1.1)
KETONES UR QL STRIP: NEGATIVE
LEUKOCYTE ESTERASE UR QL STRIP.AUTO: ABNORMAL
LYMPHOCYTES # BLD AUTO: 2.5 10*3/MM3 (ref 0.9–4.8)
LYMPHOCYTES NFR BLD AUTO: 17.2 % (ref 19.6–45.3)
MCH RBC QN AUTO: 28.4 PG (ref 27–32.7)
MCHC RBC AUTO-ENTMCNC: 31.3 G/DL (ref 32.6–36.4)
MCV RBC AUTO: 90.6 FL (ref 79.8–96.2)
MODALITY: ABNORMAL
MONOCYTES # BLD AUTO: 2.22 10*3/MM3 (ref 0.2–1.2)
MONOCYTES NFR BLD AUTO: 15.2 % (ref 5–12)
NEUTROPHILS # BLD AUTO: 9.63 10*3/MM3 (ref 1.9–8.1)
NEUTROPHILS NFR BLD AUTO: 66.2 % (ref 42.7–76)
NITRITE UR QL STRIP: NEGATIVE
NRBC BLD MANUAL-RTO: 0.4 /100 WBC (ref 0–0)
PCO2 BLDV: 75.9 MM HG (ref 41–51)
PEEP RESPIRATORY: 7.5 CM[H2O]
PH BLDV: 7.18 PH UNITS (ref 7.31–7.41)
PH UR STRIP.AUTO: 5.5 [PH] (ref 5–8)
PLATELET # BLD AUTO: 354 10*3/MM3 (ref 140–500)
PMV BLD AUTO: 11 FL (ref 6–12)
PO2 BLDV: 31.7 MM HG (ref 35–45)
POTASSIUM BLD-SCNC: 4.3 MMOL/L (ref 3.5–5.2)
PROCALCITONIN SERPL-MCNC: 0.81 NG/ML (ref 0.1–0.25)
PROT SERPL-MCNC: 6.9 G/DL (ref 6–8.5)
PROT UR QL STRIP: ABNORMAL
PROTHROMBIN TIME: 15.7 SECONDS (ref 11.7–14.2)
RBC # BLD AUTO: 5.64 10*6/MM3 (ref 4.6–6)
RBC # UR: ABNORMAL /HPF
REF LAB TEST METHOD: ABNORMAL
RH BLD: POSITIVE
SAO2 % BLDCOA: 44.3 % (ref 92–99)
SET MECH RESP RATE: 16
SODIUM BLD-SCNC: 146 MMOL/L (ref 136–145)
SP GR UR STRIP: 1.03 (ref 1–1.03)
SQUAMOUS #/AREA URNS HPF: ABNORMAL /HPF
THROMBIN TIME: 17.9 SECONDS (ref 15.7–20.4)
TOTAL RATE: 16 BREATHS/MINUTE
TROPONIN T SERPL-MCNC: 0.02 NG/ML (ref 0–0.03)
UROBILINOGEN UR QL STRIP: ABNORMAL
VENTILATOR MODE: ABNORMAL
WBC NRBC COR # BLD: 14.57 10*3/MM3 (ref 4.5–10.7)
WBC UR QL AUTO: ABNORMAL /HPF

## 2018-01-01 PROCEDURE — 31500 INSERT EMERGENCY AIRWAY: CPT | Performed by: FAMILY MEDICINE

## 2018-01-01 PROCEDURE — 25010000002 EPINEPHRINE PER 0.1 MG

## 2018-01-01 PROCEDURE — 85014 HEMATOCRIT: CPT | Performed by: FAMILY MEDICINE

## 2018-01-01 PROCEDURE — 94799 UNLISTED PULMONARY SVC/PX: CPT

## 2018-01-01 PROCEDURE — 84145 PROCALCITONIN (PCT): CPT | Performed by: FAMILY MEDICINE

## 2018-01-01 PROCEDURE — 71045 X-RAY EXAM CHEST 1 VIEW: CPT

## 2018-01-01 PROCEDURE — 87040 BLOOD CULTURE FOR BACTERIA: CPT | Performed by: FAMILY MEDICINE

## 2018-01-01 PROCEDURE — 85730 THROMBOPLASTIN TIME PARTIAL: CPT | Performed by: FAMILY MEDICINE

## 2018-01-01 PROCEDURE — 80053 COMPREHEN METABOLIC PANEL: CPT | Performed by: FAMILY MEDICINE

## 2018-01-01 PROCEDURE — 85025 COMPLETE CBC W/AUTO DIFF WBC: CPT | Performed by: FAMILY MEDICINE

## 2018-01-01 PROCEDURE — 94002 VENT MGMT INPAT INIT DAY: CPT

## 2018-01-01 PROCEDURE — 36430 TRANSFUSION BLD/BLD COMPNT: CPT

## 2018-01-01 PROCEDURE — 25010000002 EPINEPHRINE (ANAPHYLAXIS) 1 MG/ML SOLUTION: Performed by: FAMILY MEDICINE

## 2018-01-01 PROCEDURE — 84484 ASSAY OF TROPONIN QUANT: CPT | Performed by: FAMILY MEDICINE

## 2018-01-01 PROCEDURE — 86901 BLOOD TYPING SEROLOGIC RH(D): CPT | Performed by: FAMILY MEDICINE

## 2018-01-01 PROCEDURE — 86900 BLOOD TYPING SEROLOGIC ABO: CPT

## 2018-01-01 PROCEDURE — 25010000002 EPINEPHRINE PF 1 MG/10ML SOLUTION PREFILLED SYRINGE

## 2018-01-01 PROCEDURE — 86923 COMPATIBILITY TEST ELECTRIC: CPT

## 2018-01-01 PROCEDURE — 85610 PROTHROMBIN TIME: CPT | Performed by: FAMILY MEDICINE

## 2018-01-01 PROCEDURE — 74176 CT ABD & PELVIS W/O CONTRAST: CPT

## 2018-01-01 PROCEDURE — 82803 BLOOD GASES ANY COMBINATION: CPT

## 2018-01-01 PROCEDURE — 85670 THROMBIN TIME PLASMA: CPT | Performed by: FAMILY MEDICINE

## 2018-01-01 PROCEDURE — 74018 RADEX ABDOMEN 1 VIEW: CPT

## 2018-01-01 PROCEDURE — 25010000002 EPINEPHRINE PER 0.1 MG: Performed by: FAMILY MEDICINE

## 2018-01-01 PROCEDURE — P9016 RBC LEUKOCYTES REDUCED: HCPCS

## 2018-01-01 PROCEDURE — 86920 COMPATIBILITY TEST SPIN: CPT

## 2018-01-01 PROCEDURE — 25010000002 SUCCINYLCHOLINE PER 20 MG: Performed by: FAMILY MEDICINE

## 2018-01-01 PROCEDURE — 92950 HEART/LUNG RESUSCITATION CPR: CPT

## 2018-01-01 PROCEDURE — 70450 CT HEAD/BRAIN W/O DYE: CPT

## 2018-01-01 PROCEDURE — 86901 BLOOD TYPING SEROLOGIC RH(D): CPT

## 2018-01-01 PROCEDURE — 85384 FIBRINOGEN ACTIVITY: CPT | Performed by: FAMILY MEDICINE

## 2018-01-01 PROCEDURE — 81001 URINALYSIS AUTO W/SCOPE: CPT | Performed by: FAMILY MEDICINE

## 2018-01-01 PROCEDURE — 85018 HEMOGLOBIN: CPT | Performed by: FAMILY MEDICINE

## 2018-01-01 PROCEDURE — 25010000002 EPINEPHRINE PF 1 MG/10ML SOLUTION PREFILLED SYRINGE: Performed by: FAMILY MEDICINE

## 2018-01-01 PROCEDURE — 71250 CT THORAX DX C-: CPT

## 2018-01-01 PROCEDURE — 86850 RBC ANTIBODY SCREEN: CPT | Performed by: FAMILY MEDICINE

## 2018-01-01 PROCEDURE — 99291 CRITICAL CARE FIRST HOUR: CPT

## 2018-01-01 PROCEDURE — 86900 BLOOD TYPING SEROLOGIC ABO: CPT | Performed by: FAMILY MEDICINE

## 2018-01-01 RX ORDER — HYDROCHLOROTHIAZIDE 25 MG/1
25 TABLET ORAL DAILY
COMMUNITY

## 2018-01-01 RX ORDER — LORATADINE 10 MG/1
1 CAPSULE, LIQUID FILLED ORAL DAILY
COMMUNITY

## 2018-01-01 RX ORDER — ATROPINE SULFATE 1 MG/ML
INJECTION, SOLUTION INTRAMUSCULAR; INTRAVENOUS; SUBCUTANEOUS
Status: COMPLETED | OUTPATIENT
Start: 2018-01-01 | End: 2018-01-01

## 2018-01-01 RX ORDER — PANTOPRAZOLE SODIUM 40 MG/10ML
80 INJECTION, POWDER, LYOPHILIZED, FOR SOLUTION INTRAVENOUS ONCE
Status: COMPLETED | OUTPATIENT
Start: 2018-01-01 | End: 2018-01-01

## 2018-01-01 RX ORDER — GABAPENTIN 600 MG/1
600 TABLET ORAL 4 TIMES DAILY
COMMUNITY

## 2018-01-01 RX ORDER — ETOMIDATE 2 MG/ML
20 INJECTION INTRAVENOUS ONCE
Status: COMPLETED | OUTPATIENT
Start: 2018-01-01 | End: 2018-01-01

## 2018-01-01 RX ORDER — ONDANSETRON 4 MG/1
4 TABLET, FILM COATED ORAL 4 TIMES DAILY PRN
COMMUNITY

## 2018-01-01 RX ORDER — EPINEPHRINE 1 MG/ML
INJECTION, SOLUTION, CONCENTRATE INTRAVENOUS
Status: COMPLETED
Start: 2018-01-01 | End: 2018-01-01

## 2018-01-01 RX ORDER — LEVETIRACETAM 750 MG/1
1500 TABLET ORAL 2 TIMES DAILY
COMMUNITY

## 2018-01-01 RX ORDER — EPINEPHRINE 1 MG/ML
0.5 INJECTION, SOLUTION INTRAMUSCULAR; SUBCUTANEOUS ONCE
Status: COMPLETED | OUTPATIENT
Start: 2018-01-01 | End: 2018-01-01

## 2018-01-01 RX ORDER — ATORVASTATIN CALCIUM 80 MG/1
80 TABLET, FILM COATED ORAL DAILY
COMMUNITY

## 2018-01-01 RX ORDER — VERAPAMIL HYDROCHLORIDE 240 MG/1
240 TABLET, FILM COATED, EXTENDED RELEASE ORAL NIGHTLY
COMMUNITY

## 2018-01-01 RX ORDER — RANITIDINE 150 MG/1
150 TABLET ORAL NIGHTLY
COMMUNITY

## 2018-01-01 RX ORDER — SUCCINYLCHOLINE CHLORIDE 20 MG/ML
100 INJECTION INTRAMUSCULAR; INTRAVENOUS ONCE
Status: COMPLETED | OUTPATIENT
Start: 2018-01-01 | End: 2018-01-01

## 2018-01-01 RX ORDER — OXCARBAZEPINE 300 MG/1
1200 TABLET, FILM COATED ORAL 2 TIMES DAILY
COMMUNITY

## 2018-01-01 RX ORDER — CLONIDINE HYDROCHLORIDE 0.1 MG/1
0.1 TABLET ORAL EVERY 8 HOURS SCHEDULED
COMMUNITY

## 2018-01-01 RX ADMIN — ATROPINE SULFATE 1 MG: 1 INJECTION, SOLUTION INTRAMUSCULAR; INTRAVENOUS; SUBCUTANEOUS at 14:27

## 2018-01-01 RX ADMIN — SODIUM BICARBONATE 50 MEQ: 84 INJECTION, SOLUTION INTRAVENOUS at 13:16

## 2018-01-01 RX ADMIN — SODIUM CHLORIDE 1000 ML: 9 INJECTION, SOLUTION INTRAVENOUS at 14:56

## 2018-01-01 RX ADMIN — SODIUM CHLORIDE 1000 ML: 9 INJECTION, SOLUTION INTRAVENOUS at 13:22

## 2018-01-01 RX ADMIN — EPINEPHRINE 0.5 MG: 0.1 INJECTION, SOLUTION ENDOTRACHEAL; INTRACARDIAC; INTRAVENOUS at 14:25

## 2018-01-01 RX ADMIN — EPINEPHRINE 0.04 MCG/KG/MIN: 1 INJECTION, SOLUTION, CONCENTRATE INTRAVENOUS at 13:07

## 2018-01-01 RX ADMIN — EPINEPHRINE 0.5 MG: 1 INJECTION, SOLUTION INTRAMUSCULAR; SUBCUTANEOUS at 12:56

## 2018-01-01 RX ADMIN — SODIUM BICARBONATE 50 MEQ: 84 INJECTION, SOLUTION INTRAVENOUS at 12:56

## 2018-01-01 RX ADMIN — PANTOPRAZOLE SODIUM 80 MG: 40 INJECTION, POWDER, FOR SOLUTION INTRAVENOUS at 14:03

## 2018-01-01 RX ADMIN — EPINEPHRINE 0.02 MCG/KG/MIN: 1 INJECTION, SOLUTION, CONCENTRATE INTRAVENOUS at 13:05

## 2018-01-01 RX ADMIN — EPINEPHRINE 1 MG: 0.1 INJECTION, SOLUTION ENDOTRACHEAL; INTRACARDIAC; INTRAVENOUS at 14:30

## 2018-01-01 RX ADMIN — Medication 0.02 MCG/KG/MIN: at 12:24

## 2018-01-01 RX ADMIN — ETOMIDATE 20 MG: 2 INJECTION, SOLUTION INTRAVENOUS at 12:17

## 2018-01-01 RX ADMIN — Medication 0.3 MCG/KG/MIN: at 13:42

## 2018-01-01 RX ADMIN — EPINEPHRINE 1 MG: 0.1 INJECTION, SOLUTION ENDOTRACHEAL; INTRACARDIAC; INTRAVENOUS at 14:27

## 2018-01-01 RX ADMIN — PANTOPRAZOLE SODIUM 80 MG: 40 INJECTION, POWDER, FOR SOLUTION INTRAVENOUS at 14:06

## 2018-01-01 RX ADMIN — SODIUM CHLORIDE 2000 ML: 9 INJECTION, SOLUTION INTRAVENOUS at 12:17

## 2018-01-01 RX ADMIN — SUCCINYLCHOLINE CHLORIDE 100 MG: 20 INJECTION, SOLUTION INTRAMUSCULAR; INTRAVENOUS at 12:19

## 2018-01-01 RX ADMIN — ATROPINE SULFATE 0.5 MG: 1 INJECTION, SOLUTION INTRAMUSCULAR; INTRAVENOUS; SUBCUTANEOUS at 14:25

## 2018-01-01 RX ADMIN — EPINEPHRINE 0.5 MG: 1 INJECTION INTRAMUSCULAR; INTRAVENOUS; SUBCUTANEOUS at 14:21

## 2018-02-18 PROBLEM — R09.2 RESPIRATORY ARREST (HCC): Status: ACTIVE | Noted: 2018-01-01

## 2018-02-18 NOTE — NURSING NOTE
Pt arrived on the floor, unresponsive, maxxed on levophed and epinepherine, and  blood coming from NG tube. CT results called and stat pages placed to Dr. Ayala. Spoke with Dr. Ayala when pt arrived to the floor, looked at CT, STAT pge to Dr. Kelly made. Dr. Kelly and Dr. Ayala en route to the pt. Vital signs, NG drainage informed. See MD notes and vital signs.     Park Hanna RN

## 2018-02-18 NOTE — DISCHARGE SUMMARY
Admission date: 2/18/18  Discharge date: 2/18/18    Discharge diagnosis:  1. Septic shock  2. Small bowel obstruction  3. Perforated viscus  4. Bilateral lower lobe pneumonia, aspiration  5. Acute hypoxic and hypercapnic respiratory failure  6. Cardiac arrest due to all the above.    Other medical problems          Dysphagia         GERD (gastroesophageal reflux disease)         Hypertension         Multiple sclerosis            HPI:  HiIstory was performed by reviewing the chart and discussing with the staff.      Pt is a 52 y.o. male brought by [facility] from a NH for respiratory distress. Patient was found apneic at the nursing home when the Eugene maneuver was performed. The maneuver brought up a large amount of mucus/blood but patient remains unresponsive. EMS called and transferred him to ED. BP was low in route. Patient was promptly intubated in ED.  He had a KUB in ED which was concerning for obstruction and a CT was ordered. My partner Dr. Mendoza was called and accepted the patient. Shortly after, patient coded in ED and required CPR, atropine and EPi. He was then taken to radiology for urgent CT then transferred to CCU after CT.   I received a call about 4:10 PM about abnormal CT with bowel perf. I requested a stat surgery consult. By the time I got down to evaluate the patient in CCU, he already had an episode of bradycardia then was pulseless. Family at bedside did not want resuscitation and patient passed quickly after.

## 2018-02-18 NOTE — H&P
Patient Care Team:  Nate Meza MD as PCP - General (Internal Medicine)        History of present illness:  HiIstory was performed by reviewing the chart and discussing with the staff.     Pt is a 52 y.o. male brought by [facility] from a NH for respiratory distress. Patient was found apneic at the nursing home when the Eugene maneuver was performed. The maneuver brought up a large amount of mucus/blood but patient remains unresponsive. EMS called and transferred him to ED. BP was low in route. Patient was promptly intubated in ED.  He had a KUB in ED which was concerning for obstruction and a CT was ordered. My partner Kelly Ventura was called and accepted the patient. Shortly after, patient coded in ED and required CPR, atropine and EPi. He was then taken to radiology for urgent CT then transferred to CCU after CT.   I received a call about 4:10 PM about abnormal CT with bowel perf. I requested a stat surgery consult. By the time I got down to evaluate the patient in CCU, he already had an episode of bradycardia then was pulseless. Family at bedside did not want resuscitation and patient passed quickly after.   Dr. Quiñones was at bedside at the time of the event and I discussed the case with him.         Vital Signs  Temp:  [98.1 °F (36.7 °C)-100.4 °F (38 °C)] 98.1 °F (36.7 °C)  Heart Rate:  [] 144  Resp:  [6-18] 16  BP: ()/(20-92) 94/53  FiO2 (%):  [100 %] 100 %    Physical Exam:  Was not performed.  Patient passed shortly after arrival to CCU and before I assessed him.             Diagnostic imaging:  CT chest      CT abdomen            Assessment:  1. Septic shock  2. Small bowel obstruction  3. Perforated viscus  4. Bilateral lower lobe pneumonia, aspiration  5. Acute hypoxic and hypercapnic respiratory failure  6. Cardiac arrest due to all the above.      Unfortunately patient was extremely sick and would not survive surgery. Family elected for DNR and patient passed  shortly after arrival to the CCU. The diagnosis of bowel perforation was just made.          Ayaan Sheehan MD  02/18/18  4:09 PM        This note was dictated utilizing [employer] dictation

## 2018-02-18 NOTE — ED NOTES
FAINT PULSE PALPABLE LEFT CAROTID BY ER NURSE, PULSE VERIFIED WITH DOPPLER PER DR. EMMA Colbert, RN  02/18/18 [id number]

## 2018-02-18 NOTE — ED NOTES
ODOROUS DARK RED BLOOD COMING FROM PATIENT MOUTH AND NOSE, SUCTION APPLIED.      Neville Colbert RN  02/18/18 8762

## 2018-02-18 NOTE — ED PROVIDER NOTES
EMERGENCY DEPARTMENT ENCOUNTER    CHIEF COMPLAINT  Chief Complaint: respiratory distress  History given by: EMS  History limited by: acuity of condition  Room Number: 16/16  PMD: Nate Meza MD      HPI:  Pt is a 52 y.o. male brought by [facility] from a NH for respiratory distress PTA. Pt's pressure en route was 66/42. Pt's initial HR from EMS was 136. A heimlich maneuver done by NH staff apparently when patient was found apneic.  The maneuver brought up a large amount of mucus/blood but patient remains unresponsive, hypotensive and bradypneic.    Duration:  PTA  Onset: gradual  Timing: constant  Location: chest  Radiation: none  Quality: respiratory distress  Intensity/Severity: moderate  Progression: unchanged  Associated Symptoms: not known  Aggravating Factors: not known  Alleviating Factors: not known  Previous Episodes: not known  Treatment before arrival: A heimlich maneuver done by NH staff brought up a large amount of mucus.    PAST MEDICAL HISTORY  Active Ambulatory Problems     Diagnosis Date Noted   • No Active Ambulatory Problems     Resolved Ambulatory Problems     Diagnosis Date Noted   • No Resolved Ambulatory Problems     Past Medical History:   Diagnosis Date   • Anxiety    • Dysphagia    • GERD (gastroesophageal reflux disease)    • Hypertension    • Multiple sclerosis    • Other symbolic dysfunctions (CODE)    • Seizures        PAST SURGICAL HISTORY  History reviewed. No pertinent surgical history.    FAMILY HISTORY  Family History   Problem Relation Age of Onset   • Family history unknown: Yes       SOCIAL HISTORY  Social History     Social History   • Marital status: Single     Spouse name: N/A   • Number of children: N/A   • Years of education: N/A     Occupational History   • Not on file.     Social History Main Topics   • Smoking status: Unknown If Ever Smoked   • Smokeless tobacco: Not on file   • Alcohol use Defer   • Drug use: Defer   • Sexual activity: Defer     Other  Topics Concern   • Not on file     Social History Narrative   • No narrative on file       ALLERGIES  Review of patient's allergies indicates not on file.    REVIEW OF SYSTEMS  Review of Systems   Unable to perform ROS: Acuity of condition       PHYSICAL EXAM  ED Triage Vitals   Temp Pulse Resp BP SpO2   -- -- -- -- --             Temp src Heart Rate Source Patient Position BP Location FiO2 (%)   -- -- -- -- --              Physical Exam   Constitutional: He has a sickly appearance.   Eyes: Right pupil is not reactive. Left pupil is not reactive.   Musculoskeletal:        Right lower leg: He exhibits edema.        Left lower leg: He exhibits edema.   Neurological:   unresponsive   Skin: There is pallor.   Lichenified skin above his ankles       LAB RESULTS  Lab Results (last 24 hours)     Procedure Component Value Units Date/Time    CBC & Differential [[id number]] Collected:  02/18/18 1226    Specimen:  Blood Updated:  02/18/18 1241    Narrative:       The following orders were created for panel order CBC & Differential.  Procedure                               Abnormality         Status                     ---------                               -----------         ------                     CBC Auto Differential[[id number]]        Abnormal            Final result                 Please view results for these tests on the individual orders.    Comprehensive Metabolic Panel [[id number]]  (Abnormal) Collected:  02/18/18 1226    Specimen:  Blood Updated:  02/18/18 1317     Glucose 198 (H) mg/dL      BUN 74 (H) mg/dL      Creatinine 4.24 (H) mg/dL      Sodium 146 (H) mmol/L      Potassium 4.3 mmol/L      Chloride 97 (L) mmol/L      CO2 19.1 (L) mmol/L      Calcium 8.8 mg/dL      Total Protein 6.9 g/dL      Albumin 3.50 g/dL      ALT (SGPT) 38 U/L      AST (SGOT) 26 U/L      Alkaline Phosphatase 83 U/L      Total Bilirubin 0.3 mg/dL      eGFR Non African Amer 15 (L) mL/min/1.73      Globulin 3.4 gm/dL      A/G Ratio 1.0  "g/dL      BUN/Creatinine Ratio 17.5     Anion Gap 29.9 mmol/L     Troponin [[id number]]  (Normal) Collected:  02/18/18 1226    Specimen:  Blood Updated:  02/18/18 1317     Troponin T 0.019 ng/mL     Narrative:       Troponin T Reference Ranges:  Less than 0.03 ng/mL:    Negative for AMI  0.03 to 0.09 ng/mL:      Indeterminant for AMI  Greater than 0.09 ng/mL: Positive for AMI    Procalcitonin [[id number]]  (Abnormal) Collected:  02/18/18 1226    Specimen:  Blood Updated:  02/18/18 1326     Procalcitonin 0.81 (C) ng/mL     Narrative:       As a Marker for Sepsis (Non-Neonates):   1. <0.5 ng/mL represents a low risk of severe sepsis and/or septic shock.  1. >2 ng/mL represents a high risk of severe sepsis and/or septic shock.    As a Marker for Lower Respiratory Tract Infections that require antibiotic therapy:  PCT on Admission     Antibiotic Therapy             6-12 Hrs later  > 0.5                Strongly Recommended            >0.25 - <0.5         Recommended  0.1 - 0.25           Discouraged                   Remeasure/reassess PCT  <0.1                 Strongly Discouraged          Remeasure/reassess PCT      As 28 day mortality risk marker: \"Change in Procalcitonin Result\" (> 80 % or <=80 %) if Day 0 (or Day 1) and Day 4 values are available. Refer to http://www.[employer]-pct-calculator.com/   Change in PCT <=80 %   A decrease of PCT levels below or equal to 80 % defines a positive change in PCT test result representing a higher risk for 28-day all-cause mortality of patients diagnosed with severe sepsis or septic shock.  Change in PCT > 80 %   A decrease of PCT levels of more than 80 % defines a negative change in PCT result representing a lower risk for 28-day all-cause mortality of patients diagnosed with severe sepsis or septic shock.                CBC Auto Differential [[id number]]  (Abnormal) Collected:  02/18/18 1226    Specimen:  Blood Updated:  02/18/18 1241     WBC 14.57 (H) 10*3/mm3      RBC 5.64 10*6/mm3 "      Hemoglobin 16.0 g/dL      Hematocrit 51.1 %      MCV 90.6 fL      MCH 28.4 pg      MCHC 31.3 (L) g/dL      RDW 16.5 (H) %      RDW-SD 54.7 (H) fl      MPV 11.0 fL      Platelets 354 10*3/mm3      Neutrophil % 66.2 %      Lymphocyte % 17.2 (L) %      Monocyte % 15.2 (H) %      Eosinophil % 0.1 (L) %      Basophil % 0.3 %      Immature Grans % 1.0 (H) %      Neutrophils, Absolute 9.63 (H) 10*3/mm3      Lymphocytes, Absolute 2.50 10*3/mm3      Monocytes, Absolute 2.22 (H) 10*3/mm3      Eosinophils, Absolute 0.02 10*3/mm3      Basophils, Absolute 0.05 10*3/mm3      Immature Grans, Absolute 0.15 (H) 10*3/mm3      nRBC 0.4 (H) /100 WBC     Hemoglobin & Hematocrit, Blood [[id number]]  (Abnormal) Collected:  02/18/18 1311    Specimen:  Blood Updated:  02/18/18 1325     Hemoglobin 13.1 (L) g/dL      Hematocrit 43.5 %     Narrative:       Test repeated, results confirmed    Protime-INR [[id number]]  (Abnormal) Collected:  02/18/18 1312    Specimen:  Blood Updated:  02/18/18 1348     Protime 15.7 (H) Seconds      INR 1.27 (H)    aPTT [[id number]]  (Normal) Collected:  02/18/18 1312    Specimen:  Blood Updated:  02/18/18 1348     PTT 23.9 seconds     Fibrinogen [[id number]]  (Normal) Collected:  02/18/18 1312    Specimen:  Blood Updated:  02/18/18 1348     Fibrinogen 418 mg/dL     Thrombin Time [[id number]]  (Normal) Collected:  02/18/18 1312    Specimen:  Blood Updated:  02/18/18 1351     Thrombin Time 17.9 SECONDS     Blood Gas, Venous [[id number]]  (Abnormal) Collected:  02/18/18 1312    Specimen:  Venous Blood Updated:  02/18/18 1322     Site N/A     pH, Venous 7.179 (L) pH Units      pCO2, Venous 75.9 (H) mm Hg      pO2, Venous 31.7 (L) mm Hg      HCO3, Venous 28.3 (H) mmol/L      Base Excess, Venous -2.4 mmol/L      O2 Saturation Calculated 44.3 (L) %      Barometric Pressure for Blood Gas 756.1 mmHg      Modality Adult Vent     FIO2 100 %      Ventilator Mode PC     Set [facility] Resp Rate 16     Rate 16 Breaths/minute       PEEP 7.5    Narrative:       [id number] [id number] Meter: [id number] : [id number] Jb Moreno    Urinalysis With / Culture If Indicated - Urine, Catheter [[id number]]  (Abnormal) Collected:  02/18/18 1339    Specimen:  Urine from Urine, Catheter Updated:  02/18/18 1419     Color, UA Dark Yellow (A)     Appearance, UA Cloudy (A)     pH, UA 5.5     Specific Gravity, UA 1.029     Glucose, UA Negative     Ketones, UA Negative     Bilirubin, UA Negative     Blood, UA Negative     Protein, UA 30 mg/dL (1+) (A)     Leuk Esterase, UA Small (1+) (A)     Nitrite, UA Negative     Urobilinogen, UA 1.0 E.U./dL    Urinalysis, Microscopic Only - Urine, Clean Catch [[id number]]  (Abnormal) Collected:  02/18/18 1339    Specimen:  Urine from Urine, Catheter Updated:  02/18/18 1432     RBC, UA 3-5 (A) /HPF      WBC, UA 3-5 (A) /HPF      Bacteria, UA None Seen /HPF      Squamous Epithelial Cells, UA 0-2 /HPF      Hyaline Casts, UA 3-6 /LPF      Methodology Manual Light Microscopy    Blood Culture - Blood, [[id number]] Collected:  02/18/18 1402    Specimen:  Blood from Arm, Left Updated:  02/18/18 1412          I ordered the above labs and reviewed the results    RADIOLOGY  XR Abdomen KUB   Final Result      XR Chest 1 View   Final Result      XR Chest 1 View    (Results Pending)   CT Chest Without Contrast    (Results Pending)   CT Abdomen Pelvis Without Contrast    (Results Pending)        I ordered the above noted radiological studies. Interpreted by radiologist. Reviewed by me in PACS.        PROCEDURES  Intubation  Date/Time: 2/18/2018 12:21 PM  Performed by: CYNTHIA CARTER  Authorized by: CYNTHIA CARTER     Consent:     Consent obtained:  Emergent situation  Pre-procedure details:     Patient status:  Unresponsive    Pretreatment medications:  None    Paralytics:  None  Procedure details:     Preoxygenation:  Bag valve mask    CPR in progress: no      Intubation method:  Oral    Oral intubation technique:  Video-assisted    Tube size  (mm):  7.5    Number of attempts:  2 (1st attempt was originally palced but came out of the epiglottis)  Placement assessment:     ETT to lip:  25 mm    Tube secured with:  ETT martin  Critical Care  Performed by: CYNTHIA CARTER  Authorized by: CYNTHIA CARTER     Critical care provider statement:     Critical care time (minutes):  55    Critical care time was exclusive of:  Separately billable procedures and treating other patients and teaching time    Critical care was necessary to treat or prevent imminent or life-threatening deterioration of the following conditions:  Respiratory failure    Critical care was time spent personally by me on the following activities:  Blood draw for specimens, development of treatment plan with patient or surrogate, discussions with consultants, evaluation of patient's response to treatment, examination of patient, gastric intubation, interpretation of cardiac output measurements, obtaining history from patient or surrogate, review of old charts, re-evaluation of patient's condition, pulse oximetry, ordering and review of radiographic studies, ordering and review of laboratory studies and ordering and performing treatments and interventions          PROGRESS AND CONSULTS  ED Course     1218 - Ordered etomidate and succinylcholine.    1220 - Ordered IVF boli. Ordered Levophed.    1224 - Ordered labs and CXR for further evaluation.    1234 - Ordered labs for further evaluation.    1241 - Ordered abdomen KUB for further evaluation.    1255 - Ordered CXR for further evaluation. Ordered epinephrine. He is having massive dark hematemesis.    1256 - Niece now in the ED. Notified pt's niece of current pt status. She is unaware of any previous ulcers or other bleeding issues. She does not believe he is on blood thinners. Niece states at baseline, the pt is able to hold conversation and joke around. She is the closest relative, and the pt is a full code.    1300 - Ordered XR Abdomen KUB for  further evaluation.     1304 - ETCO2 - 35.  KUB shows dilated small bowel.    1328 - Ordered CT Abdomen Pelvis and CT Chest for further evaluation.    1320 - Ordered IVF for hydration.    1325 - Ordered epinephrine and protonix. Placed Pulmonology.    1328 - Ordered sodium bicarbonate..    1331 - Pt has normal stool.    1350 - Discussed pt's case with Dr. Mendoza (Pulmonology), who agreed to admit the pt.    1421 - Updated family of pt's status.     1422 - Pt is in ventricular tachycardia.     1425 - epi x1. 0.5 atropine.    1426 - CPR started. Updated family once more.     1427 - epi. Atropine     1431 - pulse checked, no radial or brachial but carotid is felt.. Narrow rhythm seen.     1432 -  in different room with family    1438 - brought family into room.    1456 - Updated family.  Getting blood and now has a BP.  Pupils are fixed and dilated.  Discussed with family.  If he codes again family would have us not do CPR and let him go.      SEE NURSES NOTES FOR EXACT TIMES    MEDICAL DECISION MAKING  Results were reviewed/discussed with the patient and they were also made aware of online access. Pt also made aware that some labs, such as cultures, will not be resulted during ER visit and follow up with PMD is necessary.     MDM  Number of Diagnoses or Management Options  Hematemesis, presence of nausea not specified:   Pneumonia due to infectious organism, unspecified laterality, unspecified part of lung:   Respiratory arrest:   Small bowel obstruction:      Amount and/or Complexity of Data Reviewed  Clinical lab tests: reviewed and ordered (Procalcitonin - 0.81. WBC - 14.75. Creatinine - 4.24)  Tests in the radiology section of CPT®: reviewed and ordered  Discuss the patient with other providers: yes (Dr. Mendoza (Pulmonology))    Critical Care  Total time providing critical care: 30-74 minutes         DIAGNOSIS  Final diagnoses:   Respiratory arrest   Hematemesis, presence of nausea not specified   Small  bowel obstruction   Pneumonia due to infectious organism, unspecified laterality, unspecified part of lung       DISPOSITION  ADMISSION    Discussed treatment plan and reason for admission with pt/family and admitting physician.  Pt/family voiced understanding of the plan for admission for further testing/treatment as needed.         Latest Documented Vital Signs:  As of 3:17 PM  BP- (!) 68/27 HR- (!) 147 Temp- 98.1 °F (36.7 °C) (Tympanic) O2 sat- 100%    --  Documentation assistance provided by trice Oconnell for Dr. Mandujano.  Information recorded by the scribe was done at my direction and has been verified and validated by me.     Jet Oconnell  02/18/18 1101       Ebenezer Mandujano MD  02/18/18 9902

## 2018-02-18 NOTE — ED NOTES
PT REMAINS UNRESPONSIVE IN CRITICAL CONDITION. FAMILY AT BEDSIDE WITH DEN.      Neville Colbert RN  02/18/18 1473

## 2018-02-18 NOTE — ED NOTES
PT SENT FROM [facility] FOR DECREASED LOC, FIRE ON SCENE REPORTS PT APPEARED TO BE CHOKING PERFORMED HEIMLICH MANEUVER REPORTS COUGHED UP WHAT LOOKED LIKE MUCUS PLUG. PT WENT UNRESPONSIVE, PULSE PRESENT, AGONAL BREATHING. EMS ARRIVED ASSISTING WITH VENTILATION VIA AMBU BAG. PT FACE BLUE IN COLOR, UNRESPONSIVE, PULSE PRESENT.       Neville Colbert RN  02/18/18 3842

## 2018-02-18 NOTE — NURSING NOTE
Blanca ESPINOZA at [facility], explained that the patients pulse was slowing down and no longer able to be dopplered, Dr. Kelly at bedside discussing patient with the niece per niece request no CPR initiated. 1635 no audible heart tones confirmed with Amirah Nogueira RN.    Park Hanna RN

## 2018-02-19 LAB
ABO + RH BLD: NORMAL
BH BB BLOOD EXPIRATION DATE: NORMAL
BH BB BLOOD TYPE BARCODE: 5100
BH BB DISPENSE STATUS: NORMAL
BH BB PRODUCT CODE: NORMAL
BH BB UNIT NUMBER: NORMAL
CROSSMATCH INTERPRETATION: NORMAL
CROSSMATCH INTERPRETATION: NORMAL
UNIT  ABO: NORMAL
UNIT  RH: NORMAL

## 2018-02-23 LAB
BACTERIA SPEC AEROBE CULT: ABNORMAL
BACTERIA SPEC AEROBE CULT: ABNORMAL
GRAM STN SPEC: ABNORMAL
ISOLATED FROM: ABNORMAL